# Patient Record
Sex: MALE | Race: BLACK OR AFRICAN AMERICAN | Employment: OTHER | URBAN - METROPOLITAN AREA
[De-identification: names, ages, dates, MRNs, and addresses within clinical notes are randomized per-mention and may not be internally consistent; named-entity substitution may affect disease eponyms.]

---

## 2017-06-11 PROBLEM — H60.20 MALIGNANT OTITIS EXTERNA: Status: ACTIVE | Noted: 2017-06-11

## 2017-06-20 PROBLEM — H60.23: Status: ACTIVE | Noted: 2017-06-20

## 2018-11-19 ENCOUNTER — HOSPITAL ENCOUNTER (INPATIENT)
Age: 83
LOS: 5 days | Discharge: NURSING, REHAB OR LONG-TERM CARE | DRG: 871 | End: 2018-11-24
Attending: EMERGENCY MEDICINE | Admitting: INTERNAL MEDICINE
Payer: MEDICARE

## 2018-11-19 ENCOUNTER — APPOINTMENT (OUTPATIENT)
Dept: CT IMAGING | Age: 83
DRG: 871 | End: 2018-11-19
Attending: EMERGENCY MEDICINE
Payer: MEDICARE

## 2018-11-19 ENCOUNTER — APPOINTMENT (OUTPATIENT)
Dept: GENERAL RADIOLOGY | Age: 83
DRG: 871 | End: 2018-11-19
Attending: EMERGENCY MEDICINE
Payer: MEDICARE

## 2018-11-19 DIAGNOSIS — R65.21 SEPTIC SHOCK (HCC): Primary | ICD-10-CM

## 2018-11-19 DIAGNOSIS — R50.9 FEVER, UNSPECIFIED FEVER CAUSE: ICD-10-CM

## 2018-11-19 DIAGNOSIS — R33.9 URINARY RETENTION: ICD-10-CM

## 2018-11-19 DIAGNOSIS — E86.0 DEHYDRATION: ICD-10-CM

## 2018-11-19 DIAGNOSIS — K56.41 FECAL IMPACTION (HCC): ICD-10-CM

## 2018-11-19 DIAGNOSIS — A41.9 SEPTIC SHOCK (HCC): Primary | ICD-10-CM

## 2018-11-19 PROBLEM — J69.0 ASPIRATION PNEUMONIA (HCC): Status: ACTIVE | Noted: 2018-11-19

## 2018-11-19 LAB
ALBUMIN SERPL-MCNC: 3.7 G/DL (ref 3.4–5)
ALBUMIN/GLOB SERPL: 0.9 {RATIO} (ref 0.8–1.7)
ALP SERPL-CCNC: 82 U/L (ref 45–117)
ALT SERPL-CCNC: 25 U/L (ref 16–61)
ANION GAP SERPL CALC-SCNC: 15 MMOL/L (ref 3–18)
APPEARANCE UR: ABNORMAL
AST SERPL-CCNC: 22 U/L (ref 15–37)
BACTERIA URNS QL MICRO: ABNORMAL /HPF
BASOPHILS # BLD: 0 K/UL (ref 0–0.06)
BASOPHILS NFR BLD: 0 % (ref 0–3)
BILIRUB SERPL-MCNC: 1.4 MG/DL (ref 0.2–1)
BILIRUB UR QL: NEGATIVE
BUN SERPL-MCNC: 22 MG/DL (ref 7–18)
BUN/CREAT SERPL: 11 (ref 12–20)
CALCIUM SERPL-MCNC: 9.4 MG/DL (ref 8.5–10.1)
CHLORIDE SERPL-SCNC: 107 MMOL/L (ref 100–108)
CO2 SERPL-SCNC: 22 MMOL/L (ref 21–32)
COLOR UR: YELLOW
CREAT SERPL-MCNC: 1.95 MG/DL (ref 0.6–1.3)
DIFFERENTIAL METHOD BLD: ABNORMAL
EOSINOPHIL # BLD: 0 K/UL (ref 0–0.4)
EOSINOPHIL NFR BLD: 0 % (ref 0–5)
EPITH CASTS URNS QL MICRO: NEGATIVE /LPF (ref 0–5)
ERYTHROCYTE [DISTWIDTH] IN BLOOD BY AUTOMATED COUNT: 14.3 % (ref 11.6–14.5)
GLOBULIN SER CALC-MCNC: 4 G/DL (ref 2–4)
GLUCOSE SERPL-MCNC: 139 MG/DL (ref 74–99)
GLUCOSE UR STRIP.AUTO-MCNC: NEGATIVE MG/DL
HCT VFR BLD AUTO: 41.1 % (ref 36–48)
HGB BLD-MCNC: 14 G/DL (ref 13–16)
HGB UR QL STRIP: ABNORMAL
KETONES UR QL STRIP.AUTO: NEGATIVE MG/DL
LACTATE BLD-SCNC: 2.67 MMOL/L (ref 0.4–2)
LACTATE BLD-SCNC: 6.58 MMOL/L (ref 0.4–2)
LEUKOCYTE ESTERASE UR QL STRIP.AUTO: NEGATIVE
LYMPHOCYTES # BLD: 0.5 K/UL (ref 0.8–3.5)
LYMPHOCYTES NFR BLD: 3 % (ref 20–51)
MCH RBC QN AUTO: 29.5 PG (ref 24–34)
MCHC RBC AUTO-ENTMCNC: 34.1 G/DL (ref 31–37)
MCV RBC AUTO: 86.5 FL (ref 74–97)
MONOCYTES # BLD: 0.3 K/UL (ref 0–1)
MONOCYTES NFR BLD: 2 % (ref 2–9)
NEUTS BAND NFR BLD MANUAL: 14 % (ref 0–5)
NEUTS SEG # BLD: 15.5 K/UL (ref 1.8–8)
NEUTS SEG NFR BLD: 81 % (ref 42–75)
NITRITE UR QL STRIP.AUTO: NEGATIVE
PH UR STRIP: 5 [PH] (ref 5–8)
PLATELET # BLD AUTO: 212 K/UL (ref 135–420)
PLATELET COMMENTS,PCOM: ABNORMAL
PMV BLD AUTO: 10.9 FL (ref 9.2–11.8)
POTASSIUM SERPL-SCNC: 3.4 MMOL/L (ref 3.5–5.5)
PROT SERPL-MCNC: 7.7 G/DL (ref 6.4–8.2)
PROT UR STRIP-MCNC: NEGATIVE MG/DL
RBC # BLD AUTO: 4.75 M/UL (ref 4.7–5.5)
RBC #/AREA URNS HPF: ABNORMAL /HPF (ref 0–5)
RBC MORPH BLD: ABNORMAL
SODIUM SERPL-SCNC: 144 MMOL/L (ref 136–145)
SP GR UR REFRACTOMETRY: 1.01 (ref 1–1.03)
UROBILINOGEN UR QL STRIP.AUTO: 0.2 EU/DL (ref 0.2–1)
WBC # BLD AUTO: 16.3 K/UL (ref 4.6–13.2)
WBC URNS QL MICRO: ABNORMAL /HPF (ref 0–4)

## 2018-11-19 PROCEDURE — 74011000250 HC RX REV CODE- 250: Performed by: STUDENT IN AN ORGANIZED HEALTH CARE EDUCATION/TRAINING PROGRAM

## 2018-11-19 PROCEDURE — 96366 THER/PROPH/DIAG IV INF ADDON: CPT

## 2018-11-19 PROCEDURE — 87077 CULTURE AEROBIC IDENTIFY: CPT

## 2018-11-19 PROCEDURE — 77030005563 HC CATH URETH INT MMGH -A

## 2018-11-19 PROCEDURE — 96365 THER/PROPH/DIAG IV INF INIT: CPT

## 2018-11-19 PROCEDURE — 77030005514 HC CATH URETH FOL14 BARD -A

## 2018-11-19 PROCEDURE — 74011000250 HC RX REV CODE- 250: Performed by: EMERGENCY MEDICINE

## 2018-11-19 PROCEDURE — 81001 URINALYSIS AUTO W/SCOPE: CPT

## 2018-11-19 PROCEDURE — 96361 HYDRATE IV INFUSION ADD-ON: CPT

## 2018-11-19 PROCEDURE — 74176 CT ABD & PELVIS W/O CONTRAST: CPT

## 2018-11-19 PROCEDURE — 83605 ASSAY OF LACTIC ACID: CPT

## 2018-11-19 PROCEDURE — 87040 BLOOD CULTURE FOR BACTERIA: CPT

## 2018-11-19 PROCEDURE — 99285 EMERGENCY DEPT VISIT HI MDM: CPT

## 2018-11-19 PROCEDURE — 80053 COMPREHEN METABOLIC PANEL: CPT

## 2018-11-19 PROCEDURE — 74011250637 HC RX REV CODE- 250/637: Performed by: EMERGENCY MEDICINE

## 2018-11-19 PROCEDURE — 71045 X-RAY EXAM CHEST 1 VIEW: CPT

## 2018-11-19 PROCEDURE — 87086 URINE CULTURE/COLONY COUNT: CPT

## 2018-11-19 PROCEDURE — 87186 SC STD MICRODIL/AGAR DIL: CPT

## 2018-11-19 PROCEDURE — 85025 COMPLETE CBC W/AUTO DIFF WBC: CPT

## 2018-11-19 PROCEDURE — 74011250636 HC RX REV CODE- 250/636: Performed by: STUDENT IN AN ORGANIZED HEALTH CARE EDUCATION/TRAINING PROGRAM

## 2018-11-19 PROCEDURE — 74011250636 HC RX REV CODE- 250/636: Performed by: EMERGENCY MEDICINE

## 2018-11-19 PROCEDURE — 96375 TX/PRO/DX INJ NEW DRUG ADDON: CPT

## 2018-11-19 PROCEDURE — 84100 ASSAY OF PHOSPHORUS: CPT

## 2018-11-19 PROCEDURE — 65660000004 HC RM CVT STEPDOWN

## 2018-11-19 PROCEDURE — 93005 ELECTROCARDIOGRAM TRACING: CPT

## 2018-11-19 PROCEDURE — 74011000258 HC RX REV CODE- 258: Performed by: EMERGENCY MEDICINE

## 2018-11-19 PROCEDURE — 83735 ASSAY OF MAGNESIUM: CPT

## 2018-11-19 PROCEDURE — 77010033678 HC OXYGEN DAILY

## 2018-11-19 RX ORDER — VANCOMYCIN/0.9 % SOD CHLORIDE 1.5G/250ML
1500 PLASTIC BAG, INJECTION (ML) INTRAVENOUS ONCE
Status: COMPLETED | OUTPATIENT
Start: 2018-11-19 | End: 2018-11-19

## 2018-11-19 RX ORDER — LEVOFLOXACIN 5 MG/ML
750 INJECTION, SOLUTION INTRAVENOUS EVERY 24 HOURS
Status: DISCONTINUED | OUTPATIENT
Start: 2018-11-19 | End: 2018-11-19

## 2018-11-19 RX ORDER — VANCOMYCIN/0.9 % SOD CHLORIDE 1 G/100 ML
1000 PLASTIC BAG, INJECTION (ML) INTRAVENOUS ONCE
Status: DISCONTINUED | OUTPATIENT
Start: 2018-11-19 | End: 2018-11-19 | Stop reason: DRUGHIGH

## 2018-11-19 RX ORDER — VANCOMYCIN HYDROCHLORIDE
1250
Status: DISCONTINUED | OUTPATIENT
Start: 2018-11-21 | End: 2018-11-20 | Stop reason: DRUGHIGH

## 2018-11-19 RX ORDER — ACETAMINOPHEN 650 MG/1
650 SUPPOSITORY RECTAL
Status: COMPLETED | OUTPATIENT
Start: 2018-11-19 | End: 2018-11-19

## 2018-11-19 RX ORDER — LEVOFLOXACIN 5 MG/ML
750 INJECTION, SOLUTION INTRAVENOUS
Status: DISCONTINUED | OUTPATIENT
Start: 2018-11-19 | End: 2018-11-23

## 2018-11-19 RX ORDER — SODIUM CHLORIDE 0.9 % (FLUSH) 0.9 %
5-10 SYRINGE (ML) INJECTION AS NEEDED
Status: DISCONTINUED | OUTPATIENT
Start: 2018-11-19 | End: 2018-11-24 | Stop reason: HOSPADM

## 2018-11-19 RX ADMIN — LEVOFLOXACIN 750 MG: 5 INJECTION, SOLUTION INTRAVENOUS at 17:23

## 2018-11-19 RX ADMIN — SODIUM CHLORIDE 1000 ML: 900 INJECTION, SOLUTION INTRAVENOUS at 17:25

## 2018-11-19 RX ADMIN — SODIUM CHLORIDE 1000 ML: 900 INJECTION, SOLUTION INTRAVENOUS at 16:50

## 2018-11-19 RX ADMIN — VANCOMYCIN HYDROCHLORIDE 1500 MG: 10 INJECTION, POWDER, LYOPHILIZED, FOR SOLUTION INTRAVENOUS at 17:23

## 2018-11-19 RX ADMIN — SODIUM CHLORIDE 600 MG: 900 INJECTION, SOLUTION INTRAVENOUS at 23:34

## 2018-11-19 RX ADMIN — WATER 2 G: 1 INJECTION INTRAMUSCULAR; INTRAVENOUS; SUBCUTANEOUS at 17:22

## 2018-11-19 RX ADMIN — ACETAMINOPHEN 650 MG: 650 SUPPOSITORY RECTAL at 18:51

## 2018-11-19 RX ADMIN — SODIUM CHLORIDE 250 ML: 900 INJECTION, SOLUTION INTRAVENOUS at 18:50

## 2018-11-19 NOTE — ED TRIAGE NOTES
Per Medical transport ([facility]), Patient is from [facility]. They said the patient has had altered mental status that was noticed around 1100 this morning.

## 2018-11-19 NOTE — H&P
EMERGENCY DEPARTMENT HISTORY AND PHYSICAL EXAM 
 
4:18 PM 
 
 
Date: 11/19/2018 Patient Name: Jana Billingsley History of Presenting Illness No chief complaint on file. History Provided By: Patient's Sister Chief Complaint: altered mental status Duration:  Hours Timing:  Worsening Location: n/a Quality: constant Severity: Moderate Modifying Factors: none Associated Symptoms: anorexia Additional History (Context): Jana Billingsley is a 80 y.o. male with past medical history of bilateral deafness, type II diabetes, hypertension, and prostate hypertrophy with urinary obstruction. He is a resident of [address] [place] [address] and was brought in by EMS. EMS stated that patient is altered at baseline but had become more altered this afternoon which prompted caregivers to call EMS. He was AOx1 but followed commands. Sister of patient present in ED, stated patient had good appetite and was at baseline yesterday. Today, she stated that he would only tell her that he doesn't feel well but would not elaborate. Patient will follow commands in the ED but will not answer questions. PCP: Other, MD Shan 
 
Current Outpatient Medications Medication Sig Dispense Refill  predniSONE (DELTASONE) 10 mg tablet 60 mg PO daily, decrease 5 mg every 3 days until complete 88 Tab 0  
 erythromycin (ILOTYCIN) ophthalmic ointment Thin film both eyes Q8 hrs x 5 days 1 Tube 0  
 bisacodyl (DULCOLAX, BISACODYL,) 10 mg suppository Insert 10 mg into rectum daily as needed.  sodium phosphate (ENEMA) 19-7 gram/118 mL enema Insert 1 Enema into rectum as needed.  glucagon (GLUCAGEN) 1 mg injection 1 mg by IntraVENous route as needed for Hypoglycemia.  bisacodyl (DULCOLAX, BISACODYL,) 5 mg EC tablet Take 10 mg by mouth daily as needed for Constipation. Indications: Constipation, do not crush  SITagliptin (JANUVIA) 25 mg tablet Take 25 mg by mouth daily. Do not crush  insulin glargine (LANTUS) 100 unit/mL injection 18 Units by SubCUTAneous route nightly.  furosemide (LASIX) 20 mg tablet Take 20 mg by mouth daily. Indications: Edema  atorvastatin (LIPITOR) 10 mg tablet Take 10 mg by mouth nightly. Indications: hypercholesterolemia  insulin aspart (NOVOLOG) 100 unit/mL injection 1-10 Units by SubCUTAneous route Before breakfast, lunch, dinner and at bedtime. Sliding scale 0-200=0units 201-250=2units 251-350=4units 351-400=6units 401-450=8units 451+=10units and call MD    
 levothyroxine (SYNTHROID) 25 mcg tablet Take 25 mcg by mouth Daily (before breakfast).  cholecalciferol, vitamin D3, 2,000 unit tab Take 1 Tab by mouth daily. Indications: VITAMIN D DEFICIENCY    
 amLODIPine (NORVASC) 10 mg tablet Take 10 mg by mouth daily. 1  
 acetaminophen (TYLENOL) 325 mg tablet Take 325 mg by mouth every six (6) hours as needed for Pain or Fever.  tamsulosin (FLOMAX) 0.4 mg capsule Take 0.4 mg by mouth nightly.  finasteride (PROSCAR) 5 mg tablet Take 5 mg by mouth daily.  lisinopril (PRINIVIL, ZESTRIL) 10 mg tablet Take 10 mg by mouth daily. Past History Past Medical History: 
Past Medical History:  
Diagnosis Date  Deaf   
 bilateral  
 Deaf  Diabetes ([facility] [address]  Hyperglycemia  Hypertension  Hypertrophy of prostate with urinary obstruction and other lower urinary tract symptoms (LUTS)  Retention of urine  Retention, urine  Scarlet fever 1946 left pt deaf  UTI (lower urinary tract infection) Past Surgical History: 
Past Surgical History:  
Procedure Laterality Date [address] [place] Family History: 
Family History Problem Relation Age of Onset  Hypertension Mother Social History: 
Social History Tobacco Use  Smoking status: Never Smoker  Smokeless tobacco: Never Used Substance Use Topics  Alcohol use: No  
  Alcohol/week: 0.0 oz  Drug use:  No  
 
 
 Allergies: 
No Known Allergies Review of Systems Review of Systems Unable to perform ROS: Mental status change Physical Exam  
 
Visit Vitals /66 Pulse (!) 104 Temp (!) 102 °F (38.9 °C) Resp 24 Ht 5' 3\" (1.6 m) Wt 75 kg (165 lb 4.8 oz) SpO2 99% BMI 29.28 kg/m² Physical Exam  
Constitutional: He appears well-developed and well-nourished. He appears lethargic. No distress. Neck: Neck supple. Cardiovascular:  
Tachycardic, normal rhythm, no murmurs, rubs, gallops. Pulmonary/Chest: Effort normal and breath sounds normal. No respiratory distress. Abdominal: He exhibits no distension. There is no tenderness. Musculoskeletal: He exhibits no edema. Lymphadenopathy:  
  He has no cervical adenopathy. Neurological: He appears lethargic. Squeezes hand on command but does not answer questions Skin: Skin is warm and dry. He is not diaphoretic. Diagnostic Study Results Labs - Recent Results (from the past 12 hour(s)) CBC WITH AUTOMATED DIFF Collection Time: 11/19/18  4:15 PM  
Result Value Ref Range WBC 16.3 (H) 4.6 - 13.2 K/uL  
 RBC 4.75 4.70 - 5.50 M/uL  
 HGB 14.0 13.0 - 16.0 g/dL HCT 41.1 36.0 - 48.0 % MCV 86.5 74.0 - 97.0 FL  
 MCH 29.5 24.0 - 34.0 PG  
 MCHC 34.1 31.0 - 37.0 g/dL  
 RDW 14.3 11.6 - 14.5 % PLATELET 593 103 - 381 K/uL MPV 10.9 9.2 - 11.8 FL  
 NEUTROPHILS PENDING % LYMPHOCYTES PENDING % MONOCYTES PENDING % EOSINOPHILS PENDING % BASOPHILS PENDING %  
 ABS. NEUTROPHILS PENDING K/UL  
 ABS. LYMPHOCYTES PENDING K/UL  
 ABS. MONOCYTES PENDING K/UL  
 ABS. EOSINOPHILS PENDING K/UL  
 ABS. BASOPHILS PENDING K/UL  
 DF PENDING   
POC LACTIC ACID Collection Time: 11/19/18  4:43 PM  
Result Value Ref Range Lactic Acid (POC) 6.30 (HH) 0.40 - 2.00 mmol/L POC LACTIC ACID Collection Time: 11/19/18  4:50 PM  
Result Value Ref Range  Lactic Acid (POC) 6.58 (HH) 0.40 - 2.00 mmol/L  
 
 
 Radiologic Studies -  
CT ABD PELV WO CONT Final Result XR CHEST PORT Final Result Medical Decision Making I am the first provider for this patient. I reviewed the vital signs, available nursing notes, past medical history, past surgical history, family history and social history. Vital Signs-Reviewed the patient's vital signs. Pulse Oximetry Analysis -  100% on room air Cardiac Monitor: 
Rate: 128 Rhythm:  Sinus Tachycardia EKG: Interpreted by the EP. Time Interpreted: 5942 Rate: 105 Rhythm: Sinus Tachycardia Interpretation: sepsis Records Reviewed: Nursing Notes and Old Medical Records (Time of Review: 4:18 PM) ED Course: Progress Notes, Reevaluation, and Consults: 
1643 Lactic acid of 6.3; urine with 3+ bacteria, moderate blood. Vancomycin and levofloxacin started. Provider Notes (Medical Decision Making): Patient meets criteria for septic shock. Hospitalist has agreed to accept admission. Patient's abdomen is distended and may be source of infection. CT showed severe fecal impaction and likely aspiration pneumonia. No evidence of hypoxia, blood pressure improved and stable. For Hospitalized Patients: 
 
1. Hospitalization Decision Time: The decision to hospitalize the patient was made by Dr. Jacquelin Schlatter at 11:05 PM on 11/19/2018 2. Aspirin: Aspirin was not given because the patient did not present with a stroke at the time of their Emergency Department evaluation Diagnosis Clinical Impression: septic shock Disposition: admitted Follow-up Information None Medication List  
  
ASK your doctor about these medications   
acetaminophen 325 mg tablet Commonly known as:  TYLENOL 
  
amLODIPine 10 mg tablet Commonly known as:  NORVASC 
  
cholecalciferol (vitamin D3) 2,000 unit Tab * DULCOLAX (BISACODYL) 5 mg EC tablet Generic drug:  bisacodyl * DULCOLAX (BISACODYL) 10 mg suppository Generic drug:  bisacodyl ENEMA 19-7 gram/118 mL enema Generic drug:  sodium phosphate 
  
erythromycin ophthalmic ointment Commonly known as:  ILOTYCIN Thin film both eyes Q8 hrs x 5 days 
  
finasteride 5 mg tablet Commonly known as:  PROSCAR 
  
glucagon 1 mg injection Commonly known as:  GLUCAGEN 
  
JANUVIA 25 mg tablet Generic drug:  SITagliptin LANTUS U-100 INSULIN 100 unit/mL injection Generic drug:  insulin glargine LASIX 20 mg tablet Generic drug:  furosemide LIPITOR 10 mg tablet Generic drug:  atorvastatin 
  
lisinopril 10 mg tablet Commonly known as:  Lawson Valentín NovoLOG U-100 Insulin aspart 100 unit/mL injection Generic drug:  insulin aspart U-100 
  
predniSONE 10 mg tablet Commonly known as:  DELTASONE 
60 mg PO daily, decrease 5 mg every 3 days until complete 
  
synthroid 25 mcg tablet Generic drug:  levothyroxine 
  
tamsulosin 0.4 mg capsule Commonly known as:  FLOMAX * This list has 2 medication(s) that are the same as other medications prescribed for you. Read the directions carefully, and ask your doctor or other care provider to review them with you.  
  
  
  
 
_______________________________ Scribe Attestation Deven Fermin DO acting as a scribe for and in the presence of Kristan Reinoso MD     
November 19, 2018 at 4:18 PM 
    
Provider Attestation:     
I personally performed the services described in the documentation, reviewed the documentation, as recorded by the scribe in my presence, and it accurately and completely records my words and actions. November 19, 2018 at 4:18 PM - Kristan Reinoso MD   
 
 
_______________________________

## 2018-11-20 LAB
ALBUMIN SERPL-MCNC: 2.5 G/DL (ref 3.4–5)
ALBUMIN/GLOB SERPL: 0.8 {RATIO} (ref 0.8–1.7)
ALP SERPL-CCNC: 55 U/L (ref 45–117)
ALT SERPL-CCNC: 17 U/L (ref 16–61)
ANION GAP SERPL CALC-SCNC: 10 MMOL/L (ref 3–18)
AST SERPL-CCNC: 23 U/L (ref 15–37)
BASOPHILS # BLD: 0 K/UL (ref 0–0.06)
BASOPHILS NFR BLD: 0 % (ref 0–3)
BILIRUB SERPL-MCNC: 1.2 MG/DL (ref 0.2–1)
BUN SERPL-MCNC: 20 MG/DL (ref 7–18)
BUN/CREAT SERPL: 16 (ref 12–20)
CALCIUM SERPL-MCNC: 7.9 MG/DL (ref 8.5–10.1)
CHLORIDE SERPL-SCNC: 113 MMOL/L (ref 100–108)
CO2 SERPL-SCNC: 22 MMOL/L (ref 21–32)
CREAT SERPL-MCNC: 1.25 MG/DL (ref 0.6–1.3)
CREAT UR-MCNC: 31.9 MG/DL (ref 30–125)
DIFFERENTIAL METHOD BLD: ABNORMAL
EOSINOPHIL # BLD: 0 K/UL (ref 0–0.4)
EOSINOPHIL #/AREA URNS HPF: NORMAL /[HPF]
EOSINOPHIL NFR BLD: 0 % (ref 0–5)
ERYTHROCYTE [DISTWIDTH] IN BLOOD BY AUTOMATED COUNT: 14.7 % (ref 11.6–14.5)
EST. AVERAGE GLUCOSE BLD GHB EST-MCNC: 169 MG/DL
GLOBULIN SER CALC-MCNC: 3.2 G/DL (ref 2–4)
GLUCOSE BLD STRIP.AUTO-MCNC: 135 MG/DL (ref 70–110)
GLUCOSE BLD STRIP.AUTO-MCNC: 78 MG/DL (ref 70–110)
GLUCOSE BLD STRIP.AUTO-MCNC: 85 MG/DL (ref 70–110)
GLUCOSE BLD STRIP.AUTO-MCNC: 95 MG/DL (ref 70–110)
GLUCOSE SERPL-MCNC: 71 MG/DL (ref 74–99)
HBA1C MFR BLD: 7.5 % (ref 4.2–5.6)
HCT VFR BLD AUTO: 33.2 % (ref 36–48)
HGB BLD-MCNC: 11.1 G/DL (ref 13–16)
L PNEUMO AG UR QL IA: NEGATIVE
LACTATE BLD-SCNC: 1.27 MMOL/L (ref 0.4–2)
LACTATE BLD-SCNC: 6.3 MMOL/L (ref 0.4–2)
LACTATE SERPL-SCNC: 1.1 MMOL/L (ref 0.4–2)
LACTATE SERPL-SCNC: 1.3 MMOL/L (ref 0.4–2)
LACTATE SERPL-SCNC: 1.3 MMOL/L (ref 0.4–2)
LACTATE SERPL-SCNC: 1.4 MMOL/L (ref 0.4–2)
LYMPHOCYTES # BLD: 1.5 K/UL (ref 0.8–3.5)
LYMPHOCYTES NFR BLD: 6 % (ref 20–51)
MAGNESIUM SERPL-MCNC: 1.8 MG/DL (ref 1.6–2.6)
MAGNESIUM SERPL-MCNC: 2.1 MG/DL (ref 1.6–2.6)
MCH RBC QN AUTO: 29.1 PG (ref 24–34)
MCHC RBC AUTO-ENTMCNC: 33.4 G/DL (ref 31–37)
MCV RBC AUTO: 87.1 FL (ref 74–97)
MONOCYTES # BLD: 0.8 K/UL (ref 0–1)
MONOCYTES NFR BLD: 3 % (ref 2–9)
NEUTS BAND NFR BLD MANUAL: 9 % (ref 0–5)
NEUTS SEG # BLD: 22.9 K/UL (ref 1.8–8)
NEUTS SEG NFR BLD: 82 % (ref 42–75)
PHOSPHATE SERPL-MCNC: 1.1 MG/DL (ref 2.5–4.9)
PHOSPHATE SERPL-MCNC: 2.7 MG/DL (ref 2.5–4.9)
PLATELET # BLD AUTO: 165 K/UL (ref 135–420)
PLATELET COMMENTS,PCOM: ABNORMAL
PMV BLD AUTO: 11 FL (ref 9.2–11.8)
POTASSIUM SERPL-SCNC: 3.7 MMOL/L (ref 3.5–5.5)
PROT SERPL-MCNC: 5.7 G/DL (ref 6.4–8.2)
RBC # BLD AUTO: 3.81 M/UL (ref 4.7–5.5)
RBC MORPH BLD: ABNORMAL
S PNEUM AG UR QL: NEGATIVE
SODIUM SERPL-SCNC: 145 MMOL/L (ref 136–145)
SODIUM UR-SCNC: 90 MMOL/L (ref 20–110)
VANCOMYCIN SERPL-MCNC: 10.8 UG/ML (ref 5–40)
WBC # BLD AUTO: 25.2 K/UL (ref 4.6–13.2)

## 2018-11-20 PROCEDURE — 84300 ASSAY OF URINE SODIUM: CPT

## 2018-11-20 PROCEDURE — 74011000272 HC RX REV CODE- 272: Performed by: INTERNAL MEDICINE

## 2018-11-20 PROCEDURE — 83605 ASSAY OF LACTIC ACID: CPT

## 2018-11-20 PROCEDURE — 83036 HEMOGLOBIN GLYCOSYLATED A1C: CPT

## 2018-11-20 PROCEDURE — 92610 EVALUATE SWALLOWING FUNCTION: CPT

## 2018-11-20 PROCEDURE — 92526 ORAL FUNCTION THERAPY: CPT

## 2018-11-20 PROCEDURE — 80053 COMPREHEN METABOLIC PANEL: CPT

## 2018-11-20 PROCEDURE — 83735 ASSAY OF MAGNESIUM: CPT

## 2018-11-20 PROCEDURE — 82570 ASSAY OF URINE CREATININE: CPT

## 2018-11-20 PROCEDURE — 77010033678 HC OXYGEN DAILY

## 2018-11-20 PROCEDURE — 65660000004 HC RM CVT STEPDOWN

## 2018-11-20 PROCEDURE — 87449 NOS EACH ORGANISM AG IA: CPT

## 2018-11-20 PROCEDURE — 87450 LEGIONELLA PNEUMOPHILA AG, URINE: CPT

## 2018-11-20 PROCEDURE — 87804 INFLUENZA ASSAY W/OPTIC: CPT

## 2018-11-20 PROCEDURE — 87205 SMEAR GRAM STAIN: CPT

## 2018-11-20 PROCEDURE — 74011250636 HC RX REV CODE- 250/636: Performed by: EMERGENCY MEDICINE

## 2018-11-20 PROCEDURE — 36415 COLL VENOUS BLD VENIPUNCTURE: CPT

## 2018-11-20 PROCEDURE — 74011000258 HC RX REV CODE- 258: Performed by: INTERNAL MEDICINE

## 2018-11-20 PROCEDURE — 74011250637 HC RX REV CODE- 250/637: Performed by: INTERNAL MEDICINE

## 2018-11-20 PROCEDURE — 84100 ASSAY OF PHOSPHORUS: CPT

## 2018-11-20 PROCEDURE — 85025 COMPLETE CBC W/AUTO DIFF WBC: CPT

## 2018-11-20 PROCEDURE — 82962 GLUCOSE BLOOD TEST: CPT

## 2018-11-20 PROCEDURE — 77030037878 HC DRSG MEPILEX >48IN BORD MOLN -B

## 2018-11-20 PROCEDURE — 80202 ASSAY OF VANCOMYCIN: CPT

## 2018-11-20 PROCEDURE — 74011250636 HC RX REV CODE- 250/636: Performed by: INTERNAL MEDICINE

## 2018-11-20 RX ORDER — LEVOTHYROXINE SODIUM 25 UG/1
25 TABLET ORAL
Status: DISCONTINUED | OUTPATIENT
Start: 2018-11-20 | End: 2018-11-20 | Stop reason: SDUPTHER

## 2018-11-20 RX ORDER — FACIAL-BODY WIPES
10 EACH TOPICAL DAILY PRN
Status: DISCONTINUED | OUTPATIENT
Start: 2018-11-20 | End: 2018-11-24 | Stop reason: HOSPADM

## 2018-11-20 RX ORDER — VANCOMYCIN HYDROCHLORIDE
1250 ONCE
Status: COMPLETED | OUTPATIENT
Start: 2018-11-20 | End: 2018-11-20

## 2018-11-20 RX ORDER — INSULIN LISPRO 100 [IU]/ML
INJECTION, SOLUTION INTRAVENOUS; SUBCUTANEOUS
Status: DISCONTINUED | OUTPATIENT
Start: 2018-11-20 | End: 2018-11-24 | Stop reason: HOSPADM

## 2018-11-20 RX ORDER — SODIUM CHLORIDE 9 MG/ML
100 INJECTION, SOLUTION INTRAVENOUS CONTINUOUS
Status: DISCONTINUED | OUTPATIENT
Start: 2018-11-20 | End: 2018-11-21

## 2018-11-20 RX ORDER — LEVOTHYROXINE SODIUM 25 UG/1
25 TABLET ORAL
Status: DISCONTINUED | OUTPATIENT
Start: 2018-11-20 | End: 2018-11-24 | Stop reason: HOSPADM

## 2018-11-20 RX ORDER — FINASTERIDE 5 MG/1
5 TABLET, FILM COATED ORAL DAILY
Status: DISCONTINUED | OUTPATIENT
Start: 2018-11-20 | End: 2018-11-24 | Stop reason: HOSPADM

## 2018-11-20 RX ORDER — TAMSULOSIN HYDROCHLORIDE 0.4 MG/1
0.4 CAPSULE ORAL
Status: DISCONTINUED | OUTPATIENT
Start: 2018-11-20 | End: 2018-11-24 | Stop reason: HOSPADM

## 2018-11-20 RX ORDER — HEPARIN SODIUM 5000 [USP'U]/ML
5000 INJECTION, SOLUTION INTRAVENOUS; SUBCUTANEOUS EVERY 8 HOURS
Status: DISCONTINUED | OUTPATIENT
Start: 2018-11-20 | End: 2018-11-24 | Stop reason: HOSPADM

## 2018-11-20 RX ORDER — MAGNESIUM SULFATE 100 %
4 CRYSTALS MISCELLANEOUS AS NEEDED
Status: DISCONTINUED | OUTPATIENT
Start: 2018-11-20 | End: 2018-11-24 | Stop reason: HOSPADM

## 2018-11-20 RX ORDER — DEXTROSE 50 % IN WATER (D50W) INTRAVENOUS SYRINGE
25-50 AS NEEDED
Status: DISCONTINUED | OUTPATIENT
Start: 2018-11-20 | End: 2018-11-24 | Stop reason: HOSPADM

## 2018-11-20 RX ORDER — SENNOSIDES 8.6 MG/1
1 TABLET ORAL DAILY
Status: DISCONTINUED | OUTPATIENT
Start: 2018-11-20 | End: 2018-11-24 | Stop reason: HOSPADM

## 2018-11-20 RX ADMIN — SENNOSIDES 8.6 MG: 8.6 TABLET, FILM COATED ORAL at 10:59

## 2018-11-20 RX ADMIN — LEVOTHYROXINE SODIUM 25 MCG: 25 TABLET ORAL at 06:10

## 2018-11-20 RX ADMIN — FINASTERIDE 5 MG: 5 TABLET, FILM COATED ORAL at 10:59

## 2018-11-20 RX ADMIN — PIPERACILLIN SODIUM AND TAZOBACTAM SODIUM 3.38 G: 3; .375 INJECTION, POWDER, LYOPHILIZED, FOR SOLUTION INTRAVENOUS at 04:17

## 2018-11-20 RX ADMIN — HEPARIN SODIUM 5000 UNITS: 5000 INJECTION, SOLUTION INTRAVENOUS; SUBCUTANEOUS at 11:00

## 2018-11-20 RX ADMIN — HEPARIN SODIUM 5000 UNITS: 5000 INJECTION, SOLUTION INTRAVENOUS; SUBCUTANEOUS at 02:28

## 2018-11-20 RX ADMIN — Medication 10 ML: at 06:10

## 2018-11-20 RX ADMIN — SODIUM CHLORIDE 100 ML/HR: 900 INJECTION, SOLUTION INTRAVENOUS at 17:10

## 2018-11-20 RX ADMIN — TAMSULOSIN HYDROCHLORIDE 0.4 MG: 0.4 CAPSULE ORAL at 21:49

## 2018-11-20 RX ADMIN — PIPERACILLIN SODIUM AND TAZOBACTAM SODIUM 3.38 G: 3; .375 INJECTION, POWDER, LYOPHILIZED, FOR SOLUTION INTRAVENOUS at 10:57

## 2018-11-20 RX ADMIN — HEPARIN SODIUM 5000 UNITS: 5000 INJECTION, SOLUTION INTRAVENOUS; SUBCUTANEOUS at 17:07

## 2018-11-20 RX ADMIN — Medication 500 ML: at 11:03

## 2018-11-20 RX ADMIN — PIPERACILLIN SODIUM AND TAZOBACTAM SODIUM 3.38 G: 3; .375 INJECTION, POWDER, LYOPHILIZED, FOR SOLUTION INTRAVENOUS at 17:07

## 2018-11-20 RX ADMIN — SODIUM CHLORIDE 1000 ML: 900 INJECTION, SOLUTION INTRAVENOUS at 00:42

## 2018-11-20 RX ADMIN — SODIUM CHLORIDE 100 ML/HR: 900 INJECTION, SOLUTION INTRAVENOUS at 02:16

## 2018-11-20 RX ADMIN — PIPERACILLIN SODIUM AND TAZOBACTAM SODIUM 3.38 G: 3; .375 INJECTION, POWDER, LYOPHILIZED, FOR SOLUTION INTRAVENOUS at 21:49

## 2018-11-20 RX ADMIN — VANCOMYCIN HYDROCHLORIDE 1250 MG: 10 INJECTION, POWDER, LYOPHILIZED, FOR SOLUTION INTRAVENOUS at 10:58

## 2018-11-20 NOTE — PROGRESS NOTES
[facility] evaluation: 
 
Patient seen and evaluated by me in the ED. Alert, but cannot obtain history 2/2 to patient's deafness & dementia. Patient hemodynamically stable and not currently requiring pressors. Blood pressure on monitor during my evaluation was 738 systolic. Patient stable enough to go to stepdown vs ICU. Patient Vitals for the past 4 hrs: 
 Pulse Resp BP SpO2  
11/19/18 2030 (!) 103 17 99/55 99 % 11/19/18 2015 (!) 108 22 101/49 98 % 11/19/18 1945 (!) 108 21 99/57 99 % 11/19/18 1930 (!) 110 20 96/54 98 % 11/19/18 1915 (!) 114 27 102/57 100 % 11/19/18 1900 (!) 120 27 113/66 100 % Physical exam:  
 
General:  Alert, resting comfortably in no distress Head:  Normocephalic, without obvious abnormality, atraumatic. Eyes:  Conjunctivae/corneas clear. PERRL, EOMs intact. L ptosis Throat: Lips, mucosa, and tongue normal. Teeth and gums normal.  
Neck: Supple, symmetrical, trachea midline Lungs:   Clear to auscultation bilaterally. Chest wall:  No tenderness or deformity. Heart:  Tachycardic rate and rhythm, S1, S2 normal, no murmur, click, rub or gallop. Abdomen:    distended, active bowel sounds. Extremities: Extremities normal, atraumatic, no cyanosis 2+ pitting edema bilateral lower extremities. Pulses: 2+ and symmetric all extremities. Skin: Skin color, texture, turgor normal. No rashes or lesions. Normal capillary refill. Neurologic: Unable to ascertain fully as patient is deaf, however, patient tracks, moves all extremities spontaneously. No facial droop noted.   
 
Yue Euceda PA-C

## 2018-11-20 NOTE — PROGRESS NOTES
[facility] Hospitalist Group Progress Note Patient: Tara Fitch Age: 80 y.o. : 1930 MR#: [id number] SSN: [id number] Date/Time: 2018 Subjective: I personally saw and evaluated this patient on 18. Patient sitting in bed in NAD, denies abdominal pain. [place]. RN in room states that patient did have small pasty BM after enema Assessment/Plan: 1- Severe Sepsis ( POA ) likely 2/2 aspiration PNA or UTI 2- Aspiration PNA 3- JIMOB at admission 4- Fecal Impaction 5- DM2 
6- BPH 
7- Hard of hearing PLAN 
on abx , follow cx Iv fluids Diet as per speech S/p enema, may need GI eval 
Monitor sugars D/w RN Case discussed with:  []Patient  []Family  []Nursing  []Case Management DVT Prophylaxis:  []Lovenox  []Hep SQ  []SCDs  []Coumadin   []On Heparin gtt Objective:  
VS:  
Visit Vitals /68 (BP 1 Location: Left arm, BP Patient Position: At rest) Pulse 87 Temp 98.3 °F (36.8 °C) Resp 19 Ht 5' 3\" (1.6 m) Wt 79 kg (174 lb 2.6 oz) SpO2 98% BMI 30.85 kg/m² Tmax/24hrs: Temp (24hrs), Av.9 °F (37.7 °C), Min:98.3 °F (36.8 °C), Max:102 °F (38.9 °C) Input/Output:  
 
Intake/Output Summary (Last 24 hours) at 2018 1333 Last data filed at 2018 1221 Gross per 24 hour Intake 5228.33 ml Output 1700 ml Net 3528.33 ml General:  Awake, follows commands Cardiovascular:  S1S2+, RRR Pulmonary:  Coarse bs b/l GI:  Soft, BS+, NT, ND Extremities:  trace edema Labs:   
Recent Results (from the past 24 hour(s)) CBC WITH AUTOMATED DIFF Collection Time: 18  4:15 PM  
Result Value Ref Range WBC 16.3 (H) 4.6 - 13.2 K/uL  
 RBC 4.75 4.70 - 5.50 M/uL  
 HGB 14.0 13.0 - 16.0 g/dL HCT 41.1 36.0 - 48.0 % MCV 86.5 74.0 - 97.0 FL  
 MCH 29.5 24.0 - 34.0 PG  
 MCHC 34.1 31.0 - 37.0 g/dL  
 RDW 14.3 11.6 - 14.5 % PLATELET 892 945 - 794 K/uL MPV 10.9 9.2 - 11.8 FL  
 NEUTROPHILS 81 (H) 42 - 75 % BAND NEUTROPHILS 14 (H) 0 - 5 % LYMPHOCYTES 3 (L) 20 - 51 % MONOCYTES 2 2 - 9 % EOSINOPHILS 0 0 - 5 % BASOPHILS 0 0 - 3 %  
 ABS. NEUTROPHILS 15.5 (H) 1.8 - 8.0 K/UL  
 ABS. LYMPHOCYTES 0.5 (L) 0.8 - 3.5 K/UL  
 ABS. MONOCYTES 0.3 0 - 1.0 K/UL  
 ABS. EOSINOPHILS 0.0 0.0 - 0.4 K/UL  
 ABS. BASOPHILS 0.0 0.0 - 0.06 K/UL  
 DF MANUAL PLATELET COMMENTS ADEQUATE PLATELETS    
 RBC COMMENTS NORMOCYTIC, NORMOCHROMIC METABOLIC PANEL, COMPREHENSIVE Collection Time: 11/19/18  4:15 PM  
Result Value Ref Range Sodium 144 136 - 145 mmol/L Potassium 3.4 (L) 3.5 - 5.5 mmol/L Chloride 107 100 - 108 mmol/L  
 CO2 22 21 - 32 mmol/L Anion gap 15 3.0 - 18 mmol/L Glucose 139 (H) 74 - 99 mg/dL BUN 22 (H) 7.0 - 18 MG/DL Creatinine 1.95 (H) 0.6 - 1.3 MG/DL  
 BUN/Creatinine ratio 11 (L) 12 - 20 GFR est AA 40 (L) >60 ml/min/1.73m2 GFR est non-AA 33 (L) >60 ml/min/1.73m2 Calcium 9.4 8.5 - 10.1 MG/DL Bilirubin, total 1.4 (H) 0.2 - 1.0 MG/DL  
 ALT (SGPT) 25 16 - 61 U/L  
 AST (SGOT) 22 15 - 37 U/L Alk. phosphatase 82 45 - 117 U/L Protein, total 7.7 6.4 - 8.2 g/dL Albumin 3.7 3.4 - 5.0 g/dL Globulin 4.0 2.0 - 4.0 g/dL A-G Ratio 0.9 0.8 - 1.7 CULTURE, BLOOD Collection Time: 11/19/18  4:15 PM  
Result Value Ref Range Special Requests: NO SPECIAL REQUESTS    
 GRAM STAIN AEROBIC AND ANAEROBIC BOTTLES GRAM NEGATIVE RODS    
 GRAM STAIN     
  SMEAR CALLED TO AND CORRECTLY REPEATED BY: ROMMEL RAHMAN RN CVT ON 11/20 AT 5811 TO Gloria Sandoval Culture result: CULTURE IN PROGRESS,FURTHER UPDATES TO FOLLOW MAGNESIUM Collection Time: 11/19/18  4:15 PM  
Result Value Ref Range Magnesium 2.1 1.6 - 2.6 mg/dL PHOSPHORUS Collection Time: 11/19/18  4:15 PM  
Result Value Ref Range Phosphorus 1.1 (L) 2.5 - 4.9 MG/DL  
CULTURE, BLOOD Collection Time: 11/19/18  4:30 PM  
Result Value Ref Range Special Requests: NO SPECIAL REQUESTS GRAM STAIN AEROBIC AND ANAEROBIC BOTTLES GRAM NEGATIVE RODS    
 GRAM STAIN     
  SMEAR CALLED TO AND CORRECTLY REPEATED BY: ROMMEL RAHMAN RN CVT ON 11/20 AT 6225 TO Debbie Banerjee Culture result: CULTURE IN PROGRESS,FURTHER UPDATES TO FOLLOW POC LACTIC ACID Collection Time: 11/19/18  4:43 PM  
Result Value Ref Range Lactic Acid (POC) 6.30 (HH) 0.40 - 2.00 mmol/L  
URINALYSIS W/ RFLX MICROSCOPIC Collection Time: 11/19/18  4:45 PM  
Result Value Ref Range Color YELLOW Appearance HAZY Specific gravity 1.015 1.003 - 1.030    
 pH (UA) 5.0 5.0 - 8.0 Protein NEGATIVE  NEG mg/dL Glucose NEGATIVE  NEG mg/dL Ketone NEGATIVE  NEG mg/dL Bilirubin NEGATIVE  NEG Blood MODERATE (A) NEG Urobilinogen 0.2 0.2 - 1.0 EU/dL Nitrites NEGATIVE  NEG Leukocyte Esterase NEGATIVE  NEG    
CULTURE, URINE Collection Time: 11/19/18  4:45 PM  
Result Value Ref Range Special Requests: NO SPECIAL REQUESTS Culture result: >100,000 COLONIES/mL GRAM NEGATIVE RODS (A) URINE MICROSCOPIC ONLY Collection Time: 11/19/18  4:45 PM  
Result Value Ref Range WBC 0 to 3 0 - 4 /hpf  
 RBC 0 to 3 0 - 5 /hpf Epithelial cells NEGATIVE  0 - 5 /lpf Bacteria 3+ (A) NEG /hpf  
EKG, 12 LEAD, INITIAL Collection Time: 11/19/18  4:46 PM  
Result Value Ref Range Ventricular Rate 105 BPM  
 Atrial Rate 105 BPM  
 P-R Interval 184 ms QRS Duration 96 ms  
 Q-T Interval 328 ms QTC Calculation (Bezet) 433 ms Calculated P Axis 43 degrees Calculated R Axis -63 degrees Calculated T Axis 79 degrees Diagnosis Sinus tachycardia Left anterior fascicular block Abnormal ECG No previous ECGs available POC LACTIC ACID Collection Time: 11/19/18  4:50 PM  
Result Value Ref Range Lactic Acid (POC) 6.58 (HH) 0.40 - 2.00 mmol/L POC LACTIC ACID Collection Time: 11/19/18  9:12 PM  
Result Value Ref Range Lactic Acid (POC) 2.67 (HH) 0.40 - 2.00 mmol/L POC LACTIC ACID  
 Collection Time: 11/20/18 12:58 AM  
Result Value Ref Range Lactic Acid (POC) 1.27 0.40 - 2.00 mmol/L  
HEMOGLOBIN A1C WITH EAG Collection Time: 11/20/18  2:30 AM  
Result Value Ref Range Hemoglobin A1c 7.5 (H) 4.2 - 5.6 % Est. average glucose 169 mg/dL SODIUM, UR, RANDOM Collection Time: 11/20/18  2:30 AM  
Result Value Ref Range Sodium,urine random 90 20 - 110 MMOL/L  
CREATININE, UR, RANDOM Collection Time: 11/20/18  2:30 AM  
Result Value Ref Range Creatinine, urine 31.90 30 - 125 mg/dL EOSINOPHILS, URINE Collection Time: 11/20/18  2:30 AM  
Result Value Ref Range Eosinophils,urine RARE    
LEGIONELLA PNEUMOPHILA AG, URINE Collection Time: 11/20/18  2:30 AM  
Result Value Ref Range Legionella Ag, urine NEGATIVE  NEG    
STREP PNEUMO AG, URINE Collection Time: 11/20/18  2:30 AM  
Result Value Ref Range Strep pneumo Ag, urine NEGATIVE  NEG    
GLUCOSE, POC Collection Time: 11/20/18  2:41 AM  
Result Value Ref Range Glucose (POC) 78 70 - 110 mg/dL Dorise Crate Collection Time: 11/20/18  6:00 AM  
Result Value Ref Range Vancomycin, random 10.8 5.0 - 40.0 UG/ML  
CBC WITH AUTOMATED DIFF Collection Time: 11/20/18  6:00 AM  
Result Value Ref Range WBC 25.2 (H) 4.6 - 13.2 K/uL  
 RBC 3.81 (L) 4.70 - 5.50 M/uL  
 HGB 11.1 (L) 13.0 - 16.0 g/dL HCT 33.2 (L) 36.0 - 48.0 % MCV 87.1 74.0 - 97.0 FL  
 MCH 29.1 24.0 - 34.0 PG  
 MCHC 33.4 31.0 - 37.0 g/dL  
 RDW 14.7 (H) 11.6 - 14.5 % PLATELET 040 385 - 737 K/uL MPV 11.0 9.2 - 11.8 FL  
 NEUTROPHILS 82 (H) 42 - 75 % BAND NEUTROPHILS 9 (H) 0 - 5 % LYMPHOCYTES 6 (L) 20 - 51 % MONOCYTES 3 2 - 9 % EOSINOPHILS 0 0 - 5 % BASOPHILS 0 0 - 3 %  
 ABS. NEUTROPHILS 22.9 (H) 1.8 - 8.0 K/UL  
 ABS. LYMPHOCYTES 1.5 0.8 - 3.5 K/UL  
 ABS. MONOCYTES 0.8 0 - 1.0 K/UL  
 ABS. EOSINOPHILS 0.0 0.0 - 0.4 K/UL  
 ABS. BASOPHILS 0.0 0.0 - 0.06 K/UL  
 DF MANUAL PLATELET COMMENTS ADEQUATE PLATELETS    
 RBC COMMENTS ANISOCYTOSIS 
1+ MAGNESIUM Collection Time: 11/20/18  6:00 AM  
Result Value Ref Range Magnesium 1.8 1.6 - 2.6 mg/dL PHOSPHORUS Collection Time: 11/20/18  6:00 AM  
Result Value Ref Range Phosphorus 2.7 2.5 - 4.9 MG/DL  
LACTIC ACID Collection Time: 11/20/18  6:00 AM  
Result Value Ref Range Lactic acid 1.3 0.4 - 2.0 MMOL/L  
METABOLIC PANEL, COMPREHENSIVE Collection Time: 11/20/18  6:00 AM  
Result Value Ref Range Sodium 145 136 - 145 mmol/L Potassium 3.7 3.5 - 5.5 mmol/L Chloride 113 (H) 100 - 108 mmol/L  
 CO2 22 21 - 32 mmol/L Anion gap 10 3.0 - 18 mmol/L Glucose 71 (L) 74 - 99 mg/dL BUN 20 (H) 7.0 - 18 MG/DL Creatinine 1.25 0.6 - 1.3 MG/DL  
 BUN/Creatinine ratio 16 12 - 20 GFR est AA >60 >60 ml/min/1.73m2 GFR est non-AA 55 (L) >60 ml/min/1.73m2 Calcium 7.9 (L) 8.5 - 10.1 MG/DL Bilirubin, total 1.2 (H) 0.2 - 1.0 MG/DL  
 ALT (SGPT) 17 16 - 61 U/L  
 AST (SGOT) 23 15 - 37 U/L Alk. phosphatase 55 45 - 117 U/L Protein, total 5.7 (L) 6.4 - 8.2 g/dL Albumin 2.5 (L) 3.4 - 5.0 g/dL Globulin 3.2 2.0 - 4.0 g/dL A-G Ratio 0.8 0.8 - 1.7 LACTIC ACID Collection Time: 11/20/18 11:52 AM  
Result Value Ref Range Lactic acid 1.3 0.4 - 2.0 MMOL/L  
GLUCOSE, POC Collection Time: 11/20/18 12:07 PM  
Result Value Ref Range Glucose (POC) 85 70 - 110 mg/dL Additional Data Reviewed:   
 
Signed By: Sp Gallegos MD   
 November 20, 2018 1:33 PM

## 2018-11-20 NOTE — ROUTINE PROCESS
Bedside and Verbal shift change report given to Kyle Chapin (oncoming nurse) by Deshaun Christie RN (offgoing nurse). Report included the following information SBAR, Intake/Output and Recent Results.

## 2018-11-20 NOTE — ROUTINE PROCESS
Assumed patient care at 1100, report received from Amrit. Patient unable to verbally communicate. Administered Swapna enema per one time admission orders. Patient abd distended. No insulin coverage required. Patient passed bedside swallow per speech and ok for meal trays.

## 2018-11-20 NOTE — ED NOTES
7:00 PM :Pt care assumed from Dr. Alejandro Chilel , ED provider. Pt complaint(s), current treatment plan, progression and available diagnostic results have been discussed thoroughly. Rounding occurred: yes Intended Disposition: TBD Pending diagnostic reports and/or labs (please list): CT 
 
7:00 PM 
Patient was seen and evaluated. Dr. Irineo Baker, the resident, states that the patient appears to have a septic shock with reassuring urine and chest x-ray. Patient's abdomen is distended and may be source of infection. I had a long talk with the patient's sister who seems to be the medical decision maker as the patient is deaf. She does not want to have heroic measures, CPR, or intubation to be done. I discussed the possibility of needing surgical intervention and will decide talking about that after the CT scan. 
 
8:54 PM 
Patient's CT scan resulted and showed that he has large fecal impaction and likely aspiration pneumonia. Patient has no hypoxia, and his blood pressure has improved. However, lactate has elevated, and I will discuss admission to the ICU. Sepsis 3-6 hour reevaluation and exam:   
Reevaluation: 
Vital Signs:  
Patient Vitals for the past 12 hrs: 
 Temp Pulse Resp BP SpO2  
11/19/18 2030  (!) 103 17 99/55 99 % 11/19/18 2015  (!) 108 22 101/49 98 % 11/19/18 1945  (!) 108 21 99/57 99 % 11/19/18 1930  (!) 110 20 96/54 98 % 11/19/18 1915  (!) 114 27 102/57 100 % 11/19/18 1900  (!) 120 27 113/66 100 % 11/19/18 1845  (!) 127 29 142/76 100 % 11/19/18 1830  (!) 127 28 138/79 100 % 11/19/18 1815  (!) 128 26 128/71 100 % 11/19/18 1800  (!) 129 27 133/63 100 % 11/19/18 1745  (!) 127 23 129/70 100 % 11/19/18 1730  99 21 122/64 100 % 11/19/18 1715  (!) 108 23 107/58 100 % 11/19/18 1700 (!) 102 °F (38.9 °C) (!) 104 24 130/66 99 % 11/19/18 1645  (!) 111 26 109/51 99 % 11/19/18 1637 (!) 101 °F (38.3 °C) (!) 108 29 122/60 97 % Cardiopulmonary assessment: RESP: Coarse breath sounds CV: Tachy Capillary refill: less than 2 seconds Peripheral pulse:   Equal  
Skin exam: 
Skin color: Pink Skin Turgor: Normal 
Sepsis 3-6 hour reevaluation and exam performed at 9:45 PM. 9:59 PM Consult: I discussed care with Dr. Ed Carrero (hospitalist). It was a standard discussion including patient history, chief complaint, available diagnostic results, and predicted treatment course. Dr. Ed Carrero will admit patient. Pt accepted to the ICU by Dr. Ashley Abdi. Pt is more alert and considered central line but MAP 74 now. Will continue to monitor and proceed with ICU admit. Rosanna Starr, DO 10:13 PM 
 
CRITICAL CARE: 
10:13 PM 
I have spent 120 minutes of critical care time involved in lab review, consultations with specialist, family decision-making, and documentation. During this entire length of time I was immediately available to the patient. Critical Care: The reason for providing this level of medical care for this critically ill patient was due a critical illness that impaired one or more vital organ systems such that there was a high probability of imminent or life threatening deterioration in the patients condition. This care involved high complexity decision making to assess, manipulate, and support vital system functions, to treat this degreee vital organ system failure and to prevent further life threatening deterioration of the patients condition. Scribe Attestation Alix Cowan acting as a scribe for and in the presence of Bharti Arias MD     
November 19, 2018 at 8:56 PM 
    
Provider Attestation:     
I personally performed the services described in the documentation, reviewed the documentation, as recorded by the scribe in my presence, and it accurately and completely records my words and actions.  November 19, 2018 at 8:56 PM - Bharti Arias MD

## 2018-11-20 NOTE — PROGRESS NOTES
conducted a Follow up consultation and Spiritual Assessment for Marisol Bonilla, who is a 80 y. o.,male. The  provided the following Interventions: 
Continued the relationship of care and support. Patient's sister Lisseth Clemente is at bedside. 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 He is not  and has been living with her until 3 years ago when he moved to a nursing facility. Patient was alert on and off during conversation. He did not speak at all during the visit. They are both Jewish and are natives of [facility]. Offered prayer and assurance of continued prayer on patient and family's behalf. Chart reviewed. The following outcomes were achieved: 
Sister expressed gratitude for pastoral care visit. Assessment: 
There are no further spiritual or Religion issues which require Spiritual Care Services interventions at this time. Plan: 
Chaplains will continue to follow and provide pastoral care as needed or requested.  recommends bedside caregivers page  on duty if patient shows signs of acute spiritual or emotional distress. The Rev. Beryl Denny [address] Spiritual Care Services [address] [facility],[address] [facility] [phone] / [facility] [phone]

## 2018-11-20 NOTE — ED NOTES
TRANSFER - OUT REPORT: 
 
Verbal report given to Lisa Downing RN (name) on Albert [place]  being transferred to [facility](unit) for routine progression of care Report consisted of patients Situation, Background, Assessment and  
Recommendations(SBAR). Information from the following report(s) SBAR, ED Summary, Intake/Output and MAR was reviewed with the receiving nurse. Lines:  
Peripheral IV 11/19/18 Left Forearm (Active) Site Assessment Clean, dry, & intact 11/19/2018  5:05 PM  
Phlebitis Assessment 0 11/19/2018  5:05 PM  
Infiltration Assessment 0 11/19/2018  5:05 PM  
Dressing Status Clean, dry, & intact 11/19/2018  5:05 PM  
Dressing Type Transparent 11/19/2018  5:05 PM  
Hub Color/Line Status Flushed 11/19/2018  5:05 PM  
Action Taken Blood drawn 11/19/2018  5:05 PM  
   
Peripheral IV 11/19/18 Right Wrist (Active) Site Assessment Clean, dry, & intact 11/19/2018  4:30 PM  
Phlebitis Assessment 0 11/19/2018  4:30 PM  
Dressing Status Clean, dry, & intact 11/19/2018  4:30 PM  
Dressing Type Transparent 11/19/2018  4:30 PM  
Hub Color/Line Status Green 11/19/2018  4:30 PM  
Action Taken Blood drawn 11/19/2018  4:30 PM  
  
 
Opportunity for questions and clarification was provided. Patient transported with: 
 Monitor

## 2018-11-20 NOTE — PROGRESS NOTES
Problem: Dysphagia (Adult) Goal: *Acute Goals and Plan of Care (Insert Text) Patient will: 1. Tolerate PO trials with 0 s/s overt distress in 4/5 trials 2. Utilize compensatory swallow strategies/maneuvers (decrease bite/sip, size/rate, alt. liq/sol) with min cues in 4/5 trials Recommend:  
Soft solid diet with thin liquids Meds as tolerated Aspiration precautions HOB >45 degrees during all intake and for at least 30 min after po Small bites/sips, Slow rate of intake Speech LAnguage Pathology bedside swallow evaluation/treatment Patient: Frankie Telles (36 y.o. male) Date: 11/20/2018 Primary Diagnosis: Septic shock ([facility] Aspiration pneumonia ([facility] Sepsis ([facility] Precautions: Aspiration ASSESSMENT : 
Based on the objective data described below, the patient presents with mild oral and suspected mild pharyngeal dysphagia. Pt seen with family member present at b/s who reports pt tolerated breakfast this am with no difficulties and normally feeds himself at the nursing home with no difficulties. Pt drowsy, eyes opening to stimulus. Pt unable to verbalize orientation information. Oral mech examination revealed decreased lingual strength/control and limited dentition (bottom dentures lost per family member). Pt tolerating thin liquids + straw via consecutive swallows and single sips with timely swallow initiation and adequate laryngeal elevation to palpation. Pt demo labored mastication with regular solids; however, able to clear given increased time and liquid wash. No overt s/sx aspiration noted across evaluation. If silent aspiration a concern, pt may benefit from MBS to rule out. Treatment performed following evaluation (see below). Will follow for further dysphagia management. Patient will benefit from skilled intervention to address the above impairments. Patients rehabilitation potential is considered to be Good Factors which may influence rehabilitation potential include:  
[]            None noted [x]            Mental ability/status [x]            Medical condition []            Home/family situation and support systems 
[]            Safety awareness 
[]            Pain tolerance/management []            Other: PLAN : 
Recommendations and Planned Interventions: As above Frequency/Duration: Patient will be followed by speech-language pathology 1-2 times per day/4-7 days per week to address goals. Discharge Recommendations: Herminio Jacobs SUBJECTIVE:  
Patient nonverbal during evaluation OBJECTIVE:  
 
Past Medical History:  
Diagnosis Date  Deaf   
 bilateral  
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  
Procedure Laterality Date [address] Prior Level of Function/Home Situation: 
Home Situation Home Environment: Long term care One/Two Story Residence: One story Living Alone: No 
Support Systems: Skilled nursing facility Patient Expects to be Discharged to[de-identified] Skilled nursing facility Current DME Used/Available at Home: None Diet prior to admission: Regular/thin Current Diet:  Regular/thin; recommend change to soft solid/thin Cognitive and Communication Status: 
Neurologic State: Alert Orientation Level: Unable to verbalize Cognition: Follows commands Oral Assessment: 
Oral Assessment Labial: No impairment Dentition: Upper dentures(Missing bottom dentures ) Lingual: Decreased strength Velum: Unable to visualize Mandible: No impairment P.O. Trials: 
Patient Position: 45 at [facility] Vocal quality prior to P.O.: Low volume Consistency Presented: Solid; Thin liquid How Presented: SLP-fed/presented;Straw;Successive swallows Bolus Acceptance: No impairment Bolus Formation/Control: Impaired Type of Impairment: Mastication;Delayed Propulsion: Delayed (# of seconds) Oral Residue: Lingual;Less than 10% of bolus Initiation of Swallow: No impairment Laryngeal Elevation: Functional 
Aspiration Signs/Symptoms: None Pharyngeal Phase Characteristics: Poor endurance Effective Modifications: Small sips and bites; Alternate liquids/solids Cues for Modifications: Minimal 
Oral Phase Severity: Mild Pharyngeal Phase Severity : Mild GCODESwallowing:  Swallow Current Status CI= 1-19%  Swallow Goal Status CI= 1-19% The severity rating is based on the following outcomes:   
Clinical Judgment Pain: 
Pt nonverbal; unable to report Treatment Performed Following Evaluation: 
Training and education provided related to anatomy/physiology of oropharyngeal swallow, diet recs, aspiration risk/precautions, positioning and compensatory strategies. Pt's family highly engaged in training and education. Will follow. After treatment:  
[]            Patient left in no apparent distress sitting up in chair 
[x]            Patient left in no apparent distress in bed 
[x]            Call bell left within reach [x]            Nursing notified 
[x]            Caregiver present 
[]            Bed alarm activated COMMUNICATION/EDUCATION:  
[x]            SLP educated pt/family with regard to compensatory swallow strategies and 
     aspiration/reflux precautions including: small bites/sips, 
     alternate liquids/solids, decrease feeding rate, HOB > 45 with all po, and upright in bed at 30 degrees after po for at least 45 minutes. [x]            Patient/family have participated as able in goal setting and plan of care. []            Patient/family agree to work toward stated goals and plan of care.  
[]            Patient understands intent and goals of therapy; neutral about participation. []            Patient is unable to participate in goal setting and plan of care. Thank you for this referral. 
Alejandro Narayanan M.S., CCC-SLP Speech-Language Pathologist

## 2018-11-20 NOTE — H&P
Hospitalist Admission NoteNAME: Ronald Francisco :  1930 MRN:  [id number] Date/Time of admission:  2018 11:05 PM 
 
Patient PCP: Shan Lowry MD 
________________________________________________________________________ My assessment of this patient's clinical condition and my plan of care is as follows. Assessment / Plan: 1. Severe Sepsis with etiology likely pulmonary (not shock d/t response to fluids) 2. Acute kidney injury d/t #5 3. Hyperbilirubinemia 4. Fecal impaction with subsequent postrenal obstruction 5. Bladder outlet obstruction d/t #4 6. Bilateral bronchial aspiration pneumonia, bacterial 
7. Microscopic hematuria 8. Known BPH 
9. Type 2 DM 10. Deaf 11. H/o dementia 1. Admit to [facility] for now with broad spectrum abx for pulm coverage; follow cultures and continue to hydrate 2. Follow cmp and if bili rises more, consider w/u for acute cholangitis - will be on zosyn which would cover such anyway. 3. Check urine lytes, eos, urine cytology, and reimage with ultrasound after resolve of impaction 4. Check rapid flu, urine legionella and strep ag 5. Prn enemas and stool softeners 6. ST consult to assess for risk of aspiration 7. Avoid nephrotoxic medications 8. Correctional coverage for DM 9. DNR/DNI Code Status: DNR/DNI Surrogate Decision Maker: tbd 
 
DVT Prophylaxis: sc hep GI Prophylaxis: not indicated Subjective: CHIEF COMPLAINT: increased lethargy HISTORY OF PRESENT ILLNESS:    
Juan Moya is a 80 y.o.  male who presents with increased lethargy and decreased responsiveness. Pt is a resident of a NH with the pmhx of dementia, DM, deafness, bph, and HTN. Was noted to be less responsive over the past day and was brought to the ED and noted to be febrile and to show evidence of sepsis with fever, elevated wbc and bilateral pulmonary infiltrates.  Pt also had evidence of JIMBO and had about 2L of urine in his bladder relieved by hudson placement. CT a/p showed severe fecal impaction. There was not report of n/v, but it was noted that pt had bilateral infiltrates on CT scan and it was suggested to be aspiration. Pt has no pneumonia symptoms, but also has dementia and is advanced in age. Pt had a lactic acid of > 6 initially. He responded well to fluids. Per pt's wife, pt is DNR/DNI, but otherwise wants everything done. We were asked to admit for work up and evaluation of the above problems. Pt, himself, unable to provide any history. Past Medical History:  
Diagnosis Date  Deaf   
 bilateral  
 Deaf  Diabetes ([facility]  Hyperglycemia  Hypertension  Hypertrophy of prostate with urinary obstruction and other lower urinary tract symptoms (LUTS)  Retention of urine  Retention, urine  Scarlet fever 1946 left pt deaf  UTI (lower urinary tract infection) Past Surgical History:  
Procedure Laterality Date [address] Social History Tobacco Use  Smoking status: Never Smoker  Smokeless tobacco: Never Used Substance Use Topics  Alcohol use: No  
  Alcohol/week: 0.0 oz Family History Problem Relation Age of Onset  Hypertension Mother No Known Allergies Prior to Admission medications Medication Sig Start Date End Date Taking? Authorizing Provider  
predniSONE (DELTASONE) 10 mg tablet 60 mg PO daily, decrease 5 mg every 3 days until complete 6/26/17   Eveline Patel MD  
erythromycin (ILOTYCIN) ophthalmic ointment Thin film both eyes Q8 hrs x 5 days 6/26/17   Eveline Patel MD  
bisacodyl (DULCOLAX, BISACODYL,) 10 mg suppository Insert 10 mg into rectum daily as needed. Alen, MD Shan  
sodium phosphate (ENEMA) 19-7 gram/118 mL enema Insert 1 Enema into rectum as needed.     Shan Lowry MD  
glucagon (GLUCAGEN) 1 mg injection 1 mg by IntraVENous route as needed for Hypoglycemia. Other, MD hSan  
bisacodyl (DULCOLAX, BISACODYL,) 5 mg EC tablet Take 10 mg by mouth daily as needed for Constipation. Indications: Constipation, do not crush    Provider, Historical  
SITagliptin (JANUVIA) 25 mg tablet Take 25 mg by mouth daily. Do not crush    Provider, Historical  
insulin glargine (LANTUS) 100 unit/mL injection 18 Units by SubCUTAneous route nightly. Provider, Historical  
furosemide (LASIX) 20 mg tablet Take 20 mg by mouth daily. Indications: Edema    Provider, Historical  
atorvastatin (LIPITOR) 10 mg tablet Take 10 mg by mouth nightly. Indications: hypercholesterolemia    Provider, Historical  
insulin aspart (NOVOLOG) 100 unit/mL injection 1-10 Units by SubCUTAneous route Before breakfast, lunch, dinner and at bedtime. Sliding scale 0-200=0units 201-250=2units 251-350=4units 351-400=6units 401-450=8units 451+=10units and call MD    Provider, Historical  
levothyroxine (SYNTHROID) 25 mcg tablet Take 25 mcg by mouth Daily (before breakfast). Provider, Historical  
cholecalciferol, vitamin D3, 2,000 unit tab Take 1 Tab by mouth daily. Indications: VITAMIN D DEFICIENCY    Provider, Historical  
amLODIPine (NORVASC) 10 mg tablet Take 10 mg by mouth daily. 7/17/15   Provider, Historical  
acetaminophen (TYLENOL) 325 mg tablet Take 325 mg by mouth every six (6) hours as needed for Pain or Fever. Provider, Historical  
tamsulosin (FLOMAX) 0.4 mg capsule Take 0.4 mg by mouth nightly. Provider, Historical  
finasteride (PROSCAR) 5 mg tablet Take 5 mg by mouth daily. Provider, Historical  
lisinopril (PRINIVIL, ZESTRIL) 10 mg tablet Take 10 mg by mouth daily. Provider, Historical  
 
 
REVIEW OF SYSTEMS:    
I am not able to complete the review of systems because: The patient is intubated and sedated  
x The patient has altered mental status due to his acute medical problems The patient has baseline aphasia from prior stroke(s) x The patient has baseline dementia and is not reliable historian The patient is in acute medical distress and unable to provide information Total of 12 systems reviewed as follows:   
   POSITIVE= bolded text  Negative = text not underlined General:  fever, chills, sweats, generalized weakness, weight loss/gain,  
   loss of appetite Eyes:    blurred vision, eye pain, loss of vision, double vision ENT:    rhinorrhea, pharyngitis Respiratory:   cough, sputum production, SOB, GRAY, wheezing, pleuritic pain  
Cardiology:   chest pain, palpitations, orthopnea, PND, edema, syncope Gastrointestinal:  abdominal pain , N/V, diarrhea, dysphagia, constipation, bleeding Genitourinary:  frequency, urgency, dysuria, hematuria, incontinence Muskuloskeletal :  arthralgia, myalgia, back pain Hematology:  easy bruising, nose or gum bleeding, lymphadenopathy Dermatological: rash, ulceration, pruritis, color change / jaundice Endocrine:   hot flashes or polydipsia Neurological:  headache, dizziness, confusion, focal weakness, paresthesia, Speech difficulties, memory loss, gait difficulty Psychological: Feelings of anxiety, depression, agitation Objective: VITALS:   
Visit Vitals BP 99/55 Pulse (!) 103 Temp (!) 102 °F (38.9 °C) Resp 17 Ht 5' 3\" (1.6 m) Wt 75 kg (165 lb 4.8 oz) SpO2 99% BMI 29.28 kg/m² PHYSICAL EXAM: 
 
General:    Somnolent and appears chronically ill with toxic appearance HEENT: Atraumatic, anicteric sclerae, pink conjunctivae No oral ulcers, mucosa moist, throat clear, dentition fair Neck:  Supple, symmetrical,  thyroid: non tender Lungs:   Clear to auscultation bilaterally. No Wheezing or Rhonchi. No rales. Chest wall:  No tenderness  No Accessory muscle use. Heart:   Regular  rhythm,  No  murmur   No edema Abdomen:   Soft, non-tender. Not distended. Bowel sounds normal 
Extremities: No cyanosis.   No clubbing,   
 Skin turgor normal, Capillary refill normal, Radial dial pulse 2+ Skin:     Not pale. Not Jaundiced  No rashes Psych:  Unable to assess Neurologic: EOMs intact. No facial asymmetry. somnolent.  
 
_______________________________________________________________________ Care Plan discussed with: 
  Comments Patient Family RN Care Manager Consultant:     
_______________________________________________________________________ Expected  Disposition:  
Home with Family HH/PT/OT/RN   
SNF/LTC x  
LEE   
________________________________________________________________________ TOTAL TIME:  60 Minutes Critical Care Provided     Minutes non procedure based Comments  
 x Reviewed previous records  
>50% of visit spent in counseling and coordination of care  Discussion with patient and/or family and questions answered 
  
 
________________________________________________________________________ Procedures: see electronic medical records for all procedures/Xrays and details which were not copied into this note but were reviewed prior to creation of Plan. LAB DATA REVIEWED:   
Recent Results (from the past 24 hour(s)) CBC WITH AUTOMATED DIFF Collection Time: 11/19/18  4:15 PM  
Result Value Ref Range WBC 16.3 (H) 4.6 - 13.2 K/uL  
 RBC 4.75 4.70 - 5.50 M/uL  
 HGB 14.0 13.0 - 16.0 g/dL HCT 41.1 36.0 - 48.0 % MCV 86.5 74.0 - 97.0 FL  
 MCH 29.5 24.0 - 34.0 PG  
 MCHC 34.1 31.0 - 37.0 g/dL  
 RDW 14.3 11.6 - 14.5 % PLATELET 552 924 - 813 K/uL MPV 10.9 9.2 - 11.8 FL  
 NEUTROPHILS 81 (H) 42 - 75 % BAND NEUTROPHILS 14 (H) 0 - 5 % LYMPHOCYTES 3 (L) 20 - 51 % MONOCYTES 2 2 - 9 % EOSINOPHILS 0 0 - 5 % BASOPHILS 0 0 - 3 %  
 ABS. NEUTROPHILS 15.5 (H) 1.8 - 8.0 K/UL  
 ABS. LYMPHOCYTES 0.5 (L) 0.8 - 3.5 K/UL  
 ABS. MONOCYTES 0.3 0 - 1.0 K/UL  
 ABS. EOSINOPHILS 0.0 0.0 - 0.4 K/UL  
 ABS. BASOPHILS 0.0 0.0 - 0.06 K/UL  
 DF MANUAL PLATELET COMMENTS ADEQUATE PLATELETS    
 RBC COMMENTS NORMOCYTIC, NORMOCHROMIC METABOLIC PANEL, COMPREHENSIVE Collection Time: 11/19/18  4:15 PM  
Result Value Ref Range Sodium 144 136 - 145 mmol/L Potassium 3.4 (L) 3.5 - 5.5 mmol/L Chloride 107 100 - 108 mmol/L  
 CO2 22 21 - 32 mmol/L Anion gap 15 3.0 - 18 mmol/L Glucose 139 (H) 74 - 99 mg/dL BUN 22 (H) 7.0 - 18 MG/DL Creatinine 1.95 (H) 0.6 - 1.3 MG/DL  
 BUN/Creatinine ratio 11 (L) 12 - 20 GFR est AA 40 (L) >60 ml/min/1.73m2 GFR est non-AA 33 (L) >60 ml/min/1.73m2 Calcium 9.4 8.5 - 10.1 MG/DL Bilirubin, total 1.4 (H) 0.2 - 1.0 MG/DL  
 ALT (SGPT) 25 16 - 61 U/L  
 AST (SGOT) 22 15 - 37 U/L Alk. phosphatase 82 45 - 117 U/L Protein, total 7.7 6.4 - 8.2 g/dL Albumin 3.7 3.4 - 5.0 g/dL Globulin 4.0 2.0 - 4.0 g/dL A-G Ratio 0.9 0.8 - 1.7 POC LACTIC ACID Collection Time: 11/19/18  4:43 PM  
Result Value Ref Range Lactic Acid (POC) 6.30 (HH) 0.40 - 2.00 mmol/L  
URINALYSIS W/ RFLX MICROSCOPIC Collection Time: 11/19/18  4:45 PM  
Result Value Ref Range Color YELLOW Appearance HAZY Specific gravity 1.015 1.003 - 1.030    
 pH (UA) 5.0 5.0 - 8.0 Protein NEGATIVE  NEG mg/dL Glucose NEGATIVE  NEG mg/dL Ketone NEGATIVE  NEG mg/dL Bilirubin NEGATIVE  NEG Blood MODERATE (A) NEG Urobilinogen 0.2 0.2 - 1.0 EU/dL Nitrites NEGATIVE  NEG Leukocyte Esterase NEGATIVE  NEG    
URINE MICROSCOPIC ONLY Collection Time: 11/19/18  4:45 PM  
Result Value Ref Range WBC 0 to 3 0 - 4 /hpf  
 RBC 0 to 3 0 - 5 /hpf Epithelial cells NEGATIVE  0 - 5 /lpf Bacteria 3+ (A) NEG /hpf  
EKG, 12 LEAD, INITIAL Collection Time: 11/19/18  4:46 PM  
Result Value Ref Range Ventricular Rate 105 BPM  
 Atrial Rate 105 BPM  
 P-R Interval 184 ms QRS Duration 96 ms  
 Q-T Interval 328 ms QTC Calculation (Bezet) 433 ms Calculated P Axis 43 degrees Calculated R Axis -63 degrees Calculated T Axis 79 degrees Diagnosis Sinus tachycardia Left anterior fascicular block Abnormal ECG No previous ECGs available POC LACTIC ACID Collection Time: 11/19/18  4:50 PM  
Result Value Ref Range Lactic Acid (POC) 6.58 (HH) 0.40 - 2.00 mmol/L POC LACTIC ACID Collection Time: 11/19/18  9:12 PM  
Result Value Ref Range Lactic Acid (POC) 2.67 (HH) 0.40 - 2.00 mmol/L Melissa Hernandez MD 
Internal Medicine Hospitalist Division

## 2018-11-20 NOTE — ED NOTES
Assumed care of pt, pt came for sepsis from [facility] due to altered mental status, unsure of baseline, pt is deaf, has urinary obstruction from constipation, hudson in place curretnly, pt receiving IV fluids, VSS, pt able to follow simple commands, nod yes or no and answer simple questions, pt alert and oriented to self only.

## 2018-11-20 NOTE — PROGRESS NOTES
NUTRITION Nursing Referral: EN/PN PTA 
  
RECOMMENDATIONS / PLAN:  
 
- Add supplements: Glucerna Shake TID.  
- Continue RD inpatient monitoring and evaluation. NUTRITION INTERVENTIONS & DIAGNOSIS:  
 
[x] Meals/snacks: modified composition 
[x] Medical food supplement therapy: initiate Nutrition Diagnosis: Inadequate oral intake related to altered mentation and decreased appetite as evidenced by pt consuming 50% or less of recent meals. ASSESSMENT:  
 
Pt with altered mentation, eaten 25% or lunch meal.  
 
Average po intake adequate to meet patients estimated nutritional needs:   [] Yes     [x] No   [] Unable to determine at this time Diet: DIET DIABETIC CONSISTENT CARB Soft Solids Food Allergies: NKFA Current Appetite:   [] Good     [x] Fair     [] Poor     [] Other: 
Appetite/meal intake prior to admission:   [x] Good usually, poor po x 1 day     [] Fair     [] Poor     [] Other: 
Feeding Limitations:  [x] Swallowing difficulty: SLP following    [] Chewing difficulty    [] Other: 
Current Meal Intake: No data found. BM: 11/20 Skin Integrity: WDL Edema:   [] No     [x] Yes Pertinent Medications: Reviewed Recent Labs  
  11/20/18 
0600 11/19/18 
1615  144  
K 3.7 3.4*  
* 107 CO2 22 22 GLU 71* 139* BUN 20* 22* CREA 1.25 1.95* CA 7.9* 9.4 MG 1.8 2.1 PHOS 2.7 1.1* ALB 2.5* 3.7 SGOT 23 22 ALT 17 25 Intake/Output Summary (Last 24 hours) at 11/20/2018 1530 Last data filed at 11/20/2018 1221 Gross per 24 hour Intake 5228.33 ml Output 1700 ml Net 3528.33 ml Anthropometrics: 
Ht Readings from Last 1 Encounters:  
11/19/18 5' 3\" (1.6 m) Last 3 Recorded Weights in this Encounter 11/19/18 1637 11/20/18 0320 Weight: 75 kg (165 lb 4.8 oz) 79 kg (174 lb 2.6 oz) Body mass index is 30.85 kg/m². Obese, Class I Weight History: weight gain per chart history Weight Metrics 11/20/2018 6/20/2017 6/11/2017 5/18/2017 9/21/2015 7/24/2015 Weight 174 lb 2.6 oz 165 lb 165 lb 146 lb 146 lb 146 lb BMI 30.85 kg/m2 28.32 kg/m2 28.32 kg/m2 25.86 kg/m2 25.87 kg/m2 25.87 kg/m2 Admitting Diagnosis: Septic shock ([facility] Asp[facility] ([facility] Sepsis ([facility] Pertinent PMHx: DM, HTN, prostate hypertrophy with urinary obstruction Education Needs:        [x] None identified  [] Identified - Not appropriate at this time  []  Identified and addressed - refer to education log Learning Limitations:   [] None identified  [x] Identified: dementia, deaf, blind Cultural, Shinto & ethnic food preferences:  [x] None identified    [] Identified and addressed ESTIMATED NUTRITION NEEDS:  
 
Calories: 8864-9747 kcal (MSJx1.2-1.3) based on  [x] Actual BW 79 kg     [] IBW Protein: 67-79 gm (0.8-1 gm/kg) based on  [x] Actual BW      [] IBW Fluid: 1 mL/kcal 
  
MONITORING & EVALUATION:  
 
Nutrition Goal(s): 1. Po intake of meals will meet >75% of patient estimated nutritional needs within the next 7 days. Outcome:  [] Met/Ongoing    []  Not Met    [x] New/Initial Goal  
 
Monitoring:   [x] Food and beverage intake   [x] Diet order   [x] Nutrition-focused physical findings   [x] Treatment/therapy   [] Weight   [] Enteral nutrition intake Previous Recommendations (for follow-up assessments only):     []   Implemented       []   Not Implemented (RD to address)      [] No Longer Appropriate     [] No Recommendation Made Discharge Planning: cardiac, diabetic diet, soft solids per SLP [x] Participated in care planning, discharge planning, & interdisciplinary rounds as appropriate Frida Boykin RD, [address]  Pager: [phone]

## 2018-11-20 NOTE — PROGRESS NOTES
conducted an initial consultation and Spiritual Assessment for Sukumar Yun, who is a 80 y. o.,male. Patients Primary Language is: [place]. According to the patients EMR Bahai Affiliation is: Jain. The reason the Patient came to the hospital is:  
Patient Active Problem List  
 Diagnosis Date Noted  Aspiration pneumonia ([facility] 11/19/2018  Septic shock ([facility] 11/19/2018  Malignant otitis externa of both ears 06/20/2017  Malignant otitis externa 06/11/2017  Urinary retention 07/24/2015  BPH (benign prostatic hypertrophy) with urinary obstruction 07/24/2015 The  provided the following Interventions: 
Initiated a relationship of care and support. Offered prayer and assurance of continued prayers on patient's behalf. Chart reviewed. The following outcomes where achieved: 
Assurance of prayer. Assessment: 
There are no spiritual or Protestant issues which require intervention at this time. Plan: 
Chaplains will continue to follow and will provide pastoral care on an as needed/requested basis.  recommends bedside caregivers page  on duty if patient shows signs of acute spiritual or emotional distress. Chaplain Delmer Pollard Spiritual Care  
[phone]

## 2018-11-20 NOTE — PROGRESS NOTES
Reason for Admission:  Septic shock ([facility] Aspiration pneumonia ([facility] Sepsis ([facility] RRAT Score:    8 Plan for utilizing home health:    Pt is a resident of [address] [place] [address] for 3 years, will return upon discharge. Likelihood of Readmission:   LOW Transition of Care Plan:         
 
Pt is nonverbal, deaf, and has dementia. Initial assessment completed with relative(s), pt's sister, POA. Face sheet information confirmed:  yes. The patient requests we communicate with sister in reference to medical care. This patient lives at [address] [place] [address]. Patient is not able to navigate steps as needed. Prior to hospitalization, patient was considered to be independent : no . If not independent,  patient needs assist with :  ALL NEEDS  Cognitive status of patient: disoriented. Patient has a current ACP document on file: yes Pt will need ambulance transport upon discharge. Patient is not currently active with home health. Patient has ever stayed in a skilled nursing facility or rehab. Was  stay within last 60 days : yes, currently resident of [address] [place] [address] This patient is on dialysis :no 
 
 Freedom of choice signed: yes, for [address] [place] [address]. Currently, the discharge plan is LTAC. Care Management Interventions PCP Verified by CM: Yes 
Palliative Care Criteria Met (RRAT>21 & CHF Dx)?: No 
Mode of Transport at Discharge: Other (see comment)(ambulance) [facility] of [facility] Consult (CM Consult[facility] 
Discharge Durable Medical Equipment: No 
Physical Therapy Consult: No 
Occupational Therapy Consult: No 
Speech Therapy Consult: No 
Current Support Network: [address] [place] [address] Confirm Follow Up Transport: Other (see comment)(ambulance) Plan discussed with Pt/Family/Caregiver: Yes Freedom of Choice Offered: Yes [employer] Information Provided?: No 
Discharge Location Discharge Placement: [facility] Case Management [phone]

## 2018-11-21 PROBLEM — K56.41 FECAL IMPACTION (HCC): Status: ACTIVE | Noted: 2018-11-21

## 2018-11-21 LAB
ANION GAP SERPL CALC-SCNC: 10 MMOL/L (ref 3–18)
ATRIAL RATE: 105 BPM
BACTERIA SPEC CULT: ABNORMAL
BASOPHILS # BLD: 0 K/UL (ref 0–0.06)
BASOPHILS NFR BLD: 0 % (ref 0–3)
BUN SERPL-MCNC: 18 MG/DL (ref 7–18)
BUN/CREAT SERPL: 15 (ref 12–20)
CALCIUM SERPL-MCNC: 8.3 MG/DL (ref 8.5–10.1)
CALCULATED P AXIS, ECG09: 43 DEGREES
CALCULATED R AXIS, ECG10: -63 DEGREES
CALCULATED T AXIS, ECG11: 79 DEGREES
CHLORIDE SERPL-SCNC: 114 MMOL/L (ref 100–108)
CO2 SERPL-SCNC: 23 MMOL/L (ref 21–32)
CREAT SERPL-MCNC: 1.2 MG/DL (ref 0.6–1.3)
DIAGNOSIS, 93000: NORMAL
DIFFERENTIAL METHOD BLD: ABNORMAL
EOSINOPHIL # BLD: 0.7 K/UL (ref 0–0.4)
EOSINOPHIL NFR BLD: 3 % (ref 0–5)
ERYTHROCYTE [DISTWIDTH] IN BLOOD BY AUTOMATED COUNT: 14.7 % (ref 11.6–14.5)
FLUAV AG NPH QL IA: NEGATIVE
FLUBV AG NOSE QL IA: NEGATIVE
GLUCOSE BLD STRIP.AUTO-MCNC: 101 MG/DL (ref 70–110)
GLUCOSE BLD STRIP.AUTO-MCNC: 129 MG/DL (ref 70–110)
GLUCOSE BLD STRIP.AUTO-MCNC: 90 MG/DL (ref 70–110)
GLUCOSE BLD STRIP.AUTO-MCNC: 95 MG/DL (ref 70–110)
GLUCOSE SERPL-MCNC: 77 MG/DL (ref 74–99)
HCT VFR BLD AUTO: 36 % (ref 36–48)
HGB BLD-MCNC: 11.5 G/DL (ref 13–16)
LACTATE SERPL-SCNC: 1 MMOL/L (ref 0.4–2)
LACTATE SERPL-SCNC: 1.2 MMOL/L (ref 0.4–2)
LACTATE SERPL-SCNC: 1.2 MMOL/L (ref 0.4–2)
LYMPHOCYTES # BLD: 1.3 K/UL (ref 0.8–3.5)
LYMPHOCYTES NFR BLD: 6 % (ref 20–51)
MAGNESIUM SERPL-MCNC: 2 MG/DL (ref 1.6–2.6)
MCH RBC QN AUTO: 28.3 PG (ref 24–34)
MCHC RBC AUTO-ENTMCNC: 31.9 G/DL (ref 31–37)
MCV RBC AUTO: 88.7 FL (ref 74–97)
MONOCYTES # BLD: 1.3 K/UL (ref 0–1)
MONOCYTES NFR BLD: 6 % (ref 2–9)
NEUTS BAND NFR BLD MANUAL: 3 % (ref 0–5)
NEUTS SEG # BLD: 19.1 K/UL (ref 1.8–8)
NEUTS SEG NFR BLD: 82 % (ref 42–75)
P-R INTERVAL, ECG05: 184 MS
PHOSPHATE SERPL-MCNC: 2.2 MG/DL (ref 2.5–4.9)
PLATELET # BLD AUTO: 162 K/UL (ref 135–420)
PLATELET COMMENTS,PCOM: ABNORMAL
PMV BLD AUTO: 10.9 FL (ref 9.2–11.8)
POTASSIUM SERPL-SCNC: 3.6 MMOL/L (ref 3.5–5.5)
Q-T INTERVAL, ECG07: 328 MS
QRS DURATION, ECG06: 96 MS
QTC CALCULATION (BEZET), ECG08: 433 MS
RBC # BLD AUTO: 4.06 M/UL (ref 4.7–5.5)
RBC MORPH BLD: ABNORMAL
SERVICE CMNT-IMP: ABNORMAL
SODIUM SERPL-SCNC: 147 MMOL/L (ref 136–145)
VANCOMYCIN SERPL-MCNC: 10.8 UG/ML (ref 5–40)
VENTRICULAR RATE, ECG03: 105 BPM
WBC # BLD AUTO: 22.4 K/UL (ref 4.6–13.2)

## 2018-11-21 PROCEDURE — 74011000258 HC RX REV CODE- 258: Performed by: EMERGENCY MEDICINE

## 2018-11-21 PROCEDURE — 74011250636 HC RX REV CODE- 250/636: Performed by: STUDENT IN AN ORGANIZED HEALTH CARE EDUCATION/TRAINING PROGRAM

## 2018-11-21 PROCEDURE — 84100 ASSAY OF PHOSPHORUS: CPT

## 2018-11-21 PROCEDURE — 74011250636 HC RX REV CODE- 250/636: Performed by: INTERNAL MEDICINE

## 2018-11-21 PROCEDURE — 77030027138 HC INCENT SPIROMETER -A

## 2018-11-21 PROCEDURE — 99223 1ST HOSP IP/OBS HIGH 75: CPT | Performed by: NURSE PRACTITIONER

## 2018-11-21 PROCEDURE — 87040 BLOOD CULTURE FOR BACTERIA: CPT

## 2018-11-21 PROCEDURE — 85025 COMPLETE CBC W/AUTO DIFF WBC: CPT

## 2018-11-21 PROCEDURE — 77030037870 HC GLD SHT PREVALON SAGE -B

## 2018-11-21 PROCEDURE — 74011250637 HC RX REV CODE- 250/637: Performed by: EMERGENCY MEDICINE

## 2018-11-21 PROCEDURE — 82962 GLUCOSE BLOOD TEST: CPT

## 2018-11-21 PROCEDURE — 65660000004 HC RM CVT STEPDOWN

## 2018-11-21 PROCEDURE — 77030037878 HC DRSG MEPILEX >48IN BORD MOLN -B

## 2018-11-21 PROCEDURE — 83735 ASSAY OF MAGNESIUM: CPT

## 2018-11-21 PROCEDURE — 76450000000

## 2018-11-21 PROCEDURE — 36415 COLL VENOUS BLD VENIPUNCTURE: CPT

## 2018-11-21 PROCEDURE — 83605 ASSAY OF LACTIC ACID: CPT

## 2018-11-21 PROCEDURE — 92526 ORAL FUNCTION THERAPY: CPT

## 2018-11-21 PROCEDURE — 80048 BASIC METABOLIC PNL TOTAL CA: CPT

## 2018-11-21 PROCEDURE — 74011000258 HC RX REV CODE- 258: Performed by: INTERNAL MEDICINE

## 2018-11-21 PROCEDURE — 74011250637 HC RX REV CODE- 250/637: Performed by: INTERNAL MEDICINE

## 2018-11-21 PROCEDURE — 80202 ASSAY OF VANCOMYCIN: CPT

## 2018-11-21 PROCEDURE — 74011250636 HC RX REV CODE- 250/636: Performed by: EMERGENCY MEDICINE

## 2018-11-21 RX ORDER — VANCOMYCIN/0.9 % SOD CHLORIDE 1.5G/250ML
1500 PLASTIC BAG, INJECTION (ML) INTRAVENOUS ONCE
Status: DISCONTINUED | OUTPATIENT
Start: 2018-11-21 | End: 2018-11-21

## 2018-11-21 RX ORDER — DEXTROMETHORPHAN POLISTIREX 30 MG/5 ML
SUSPENSION, EXTENDED RELEASE 12 HR ORAL
Status: COMPLETED | OUTPATIENT
Start: 2018-11-21 | End: 2018-11-21

## 2018-11-21 RX ORDER — SODIUM CHLORIDE 450 MG/100ML
75 INJECTION, SOLUTION INTRAVENOUS CONTINUOUS
Status: DISCONTINUED | OUTPATIENT
Start: 2018-11-21 | End: 2018-11-24

## 2018-11-21 RX ADMIN — SODIUM CHLORIDE 75 ML/HR: 450 INJECTION, SOLUTION INTRAVENOUS at 12:09

## 2018-11-21 RX ADMIN — SODIUM CHLORIDE 100 ML/HR: 900 INJECTION, SOLUTION INTRAVENOUS at 04:16

## 2018-11-21 RX ADMIN — LEVOTHYROXINE SODIUM 25 MCG: 25 TABLET ORAL at 05:24

## 2018-11-21 RX ADMIN — HEPARIN SODIUM 5000 UNITS: 5000 INJECTION, SOLUTION INTRAVENOUS; SUBCUTANEOUS at 04:17

## 2018-11-21 RX ADMIN — FINASTERIDE 5 MG: 5 TABLET, FILM COATED ORAL at 09:42

## 2018-11-21 RX ADMIN — PIPERACILLIN SODIUM AND TAZOBACTAM SODIUM 3.38 G: 3; .375 INJECTION, POWDER, LYOPHILIZED, FOR SOLUTION INTRAVENOUS at 21:58

## 2018-11-21 RX ADMIN — HEPARIN SODIUM 5000 UNITS: 5000 INJECTION, SOLUTION INTRAVENOUS; SUBCUTANEOUS at 17:15

## 2018-11-21 RX ADMIN — TAMSULOSIN HYDROCHLORIDE 0.4 MG: 0.4 CAPSULE ORAL at 21:58

## 2018-11-21 RX ADMIN — SENNOSIDES 8.6 MG: 8.6 TABLET, FILM COATED ORAL at 09:28

## 2018-11-21 RX ADMIN — PIPERACILLIN SODIUM AND TAZOBACTAM SODIUM 3.38 G: 3; .375 INJECTION, POWDER, LYOPHILIZED, FOR SOLUTION INTRAVENOUS at 09:28

## 2018-11-21 RX ADMIN — HEPARIN SODIUM 5000 UNITS: 5000 INJECTION, SOLUTION INTRAVENOUS; SUBCUTANEOUS at 09:28

## 2018-11-21 RX ADMIN — VANCOMYCIN HYDROCHLORIDE 1500 MG: 10 INJECTION, POWDER, LYOPHILIZED, FOR SOLUTION INTRAVENOUS at 11:00

## 2018-11-21 RX ADMIN — PIPERACILLIN SODIUM AND TAZOBACTAM SODIUM 3.38 G: 3; .375 INJECTION, POWDER, LYOPHILIZED, FOR SOLUTION INTRAVENOUS at 04:14

## 2018-11-21 RX ADMIN — MINERAL OIL 133 ML: 100 ENEMA RECTAL at 17:29

## 2018-11-21 RX ADMIN — LEVOFLOXACIN 750 MG: 5 INJECTION, SOLUTION INTRAVENOUS at 17:16

## 2018-11-21 RX ADMIN — PIPERACILLIN SODIUM AND TAZOBACTAM SODIUM 3.38 G: 3; .375 INJECTION, POWDER, LYOPHILIZED, FOR SOLUTION INTRAVENOUS at 17:17

## 2018-11-21 NOTE — PROGRESS NOTES
Problem: Dysphagia (Adult) Goal: *Acute Goals and Plan of Care (Insert Text) Patient will: 1. Tolerate PO trials with 0 s/s overt distress in 4/5 trials 2. Utilize compensatory swallow strategies/maneuvers (decrease bite/sip, size/rate, alt. liq/sol) with min cues in 4/5 trials Recommend:  
Soft solid diet with thin liquids Meds as tolerated Aspiration precautions HOB >45 degrees during all intake and for at least 30 min after po Small bites/sips, Slow rate of intake Outcome: Progressing Towards Goal 
Speech language pathology dysphagia treatment Patient: Ministerio Johnson (25 y.o. male) Date: 11/21/2018 Diagnosis: Septic shock ([facility] Aspiration pneumonia ([facility] Sepsis ([facility] Precautions: Aspiration ASSESSMENT: 
Pt seen for follow up dysphagia tx with family present at b/s. Pt alert and more responsive this am.  Per family, pt with limited intake this am.  Pt observed with med pass with RN present, tolerating consecutive swallows of thin liquids to clear pill with no overt s/sx aspiration. Pt tolerating solid presentations with mildly increased oral prep phase noted; however, no oral residue noted post swallow. Recommend continue current diet. If silent aspiration a concern, recommend MBS at MD discretion. Will follow as indicated for further dysphagia management. D/w pt, family and RN. Progression toward goals: 
[]         Improving appropriately and progressing toward goals [x]         Improving slowly and progressing toward goals 
[]         Not making progress toward goals and plan of care will be adjusted PLAN: 
Recommendations and Planned Interventions: 
Patient continues to benefit from skilled intervention to address the above impairments. Continue treatment per established plan of care. Discharge Recommendations:  Herminio Jacobs SUBJECTIVE:  
Patient stated Go it. OBJECTIVE:  
Cognitive and Communication Status: Neurologic State: (P) Alert, Eyes open spontaneously Orientation Level: Unable to verbalize Cognition: (P) Follows commands Dysphagia Treatment: 
Oral Assessment: 
Oral Assessment Labial: No impairment Dentition: Upper dentures(Missing bottom dentures ) Lingual: Decreased strength Velum: Unable to visualize Mandible: No impairment P.O. Trials: 
 Patient Position: 45 at [facility] Vocal quality prior to P.O.: Low volume Consistency Presented: Solid, Thin liquid, Pill How Presented: SLP-fed/presented, Straw, Successive swallows Bolus Acceptance: No impairment Bolus Formation/Control: Impaired Type of Impairment: Mastication, Delayed Propulsion: Delayed (# of seconds) Oral residue:  None Initiation of Swallow: No impairment Laryngeal Elevation: Functional 
 Aspiration Signs/Symptoms: None Pharyngeal Phase Characteristics: Poor endurance Effective Modifications: Small sips and bites, Alternate liquids/solids Cues for Modifications: Minimal 
 Oral Phase Severity: Mild Pharyngeal Phase Severity : Mild PAIN:No pain reported prior to or following tx After treatment:  
[]              Patient left in no apparent distress sitting up in chair 
[x]              Patient left in no apparent distress in bed 
[x]              Call bell left within reach [x]              Nursing notified 
[x]              Caregiver present 
[]              Bed alarm activated COMMUNICATION/EDUCATION:  
[x]              SLP educated pt/family with regard to compensatory swallow strategies and 
      aspiration/reflux precautions including: small bites/sips, 
      alternate liquids/solids, decrease feeding rate, HOB > 45 with all po, and 
                 upright in bed at 30 degrees after po for at least 45 
      minutes. Declan Layne M.S., CCC-SLP Speech-Language Pathologist

## 2018-11-21 NOTE — CONSULTS
[facility] [place]note 2]: [id number])  [facility]: [phone]   [facility]: [phone]    Patient Name: Marian Morales  YOB: 1930    Date of Initial Consult: 11/21/2018  Reason for Consult: goals of care  Requesting Provider: Marisol John MD   Primary Care Physician: Other, MD Shan      SUMMARY:   Marian Morales is a 80y.o. year old with a past history of T2DM, BPH, hearing impaired, dementia who was admitted on 11/19/2018 from [address] with a diagnosis of sepsis. Patient had increased lethargy and decreased responsiveness over the past day per sister. He was brought to the ED and noted to be febrile with elevated WBC, UA with 3+ bacteria, neg nitrites and leukocyte esterace. CT abdomen/pelvis with large fecal impaction compressing the prostate and bladder against the pubis. Bronchial thickening and bilateral patchy parenchymal opacities consistent with bronchopneumonia or aspiration. He was started on IV levaquin and zosyn and admitted to the hospital. Current medical issues leading to Palliative Medicine involvement include: advanced age, dementia, goals of care. PALLIATIVE DIAGNOSES:   1. Advanced care planning  2. Sepsis  3. Constipation  4. Debility       PLAN:   1. Advanced care planning: Palliative care team including Kimberly Viveros RN, Rutherford Boxer, LCSW and myself met with patient's sister, Hayden Stanford, in patient's room. Patient was sitting up in bed being fed by Hayden Stanford. He is non=verbal. Per Hayden Stanford, patient is unmarried and has no children. He is one of 10 siblings, 7 surviving and located throughout the country. She states she has power of  for healthcare and [facility] has a copy. We have asked her to have a copy sent to our institution for inclusion in chart. Per Hayden Stanford, patient has been declining since admission to [facility]. Over the past year, he has become wheelchair bound. He was able to feed himself although recently his appetite decreased. He was still participating in PT twice weekly. Instructed Carie on seriousness of patient's illness and probability of decreased ability to recover to former self. We also discussed likelihood of future discussions and changes in goals of care. Goals of care currently: DNR/DNI. DDNR signed and placed on chart. 2. Sepsis:  On admission, febrile with elevated WBC, UA with 3+ bacteria, neg nitrites and leukocyte esterace. CT abdomen/pelvis with large fecal impaction compressing the prostate and bladder against the pubis. Bronchial thickening and bilateral patchy parenchymal opacities consistent with bronchopneumonia or aspiration. Lactic acid was 6.3 and has now recovered to normal levels. He was started on IV levaquin and zosyn and admitted to the hospital. Primary team managing. 3. Constipation: CT abdomen/pelvis with large fecal impaction compressing the prostate and bladder against the pubis. He has not had any nausea/vomiting. Patient has been given BAILEE enema administered per nursing on 11/20/2018. Small BM after enema. Will likely require aggressive bowel regimen and/or GI consult. Primary team managing. 4. Debility: Patient demonstrating decreased ability for ambulation over the past year per family. PT/OT consults. Plan for discharge back to [address] [place] [address] when medically clear. 5. Initial consult note routed to primary continuity provider  6.  Communicated plan of care with: Palliative IDT    GOALS OF CARE:  Patient/Health Care Proxy Stated Goals: Rehabilitation      TREATMENT PREFERENCES:   Code Status: DNR/DNI  Advance Care Planning:  Advance Care Planning 11/21/2018   Patient's Healthcare Decision Maker is: Named in scanned ACP document   Primary Decision Maker Name -   Primary Decision Maker Phone Number -   Confirm Advance Directive Yes, not on file   Patient Would Like to Complete Advance Directive -   Does the patient have other document types Do Not Resuscitate     HEALTHCARE POWER OF  REQUESTED TO BE FAXED FROM [facility] BY PATIENT'S SISTERDAVE. Primary Decision Maker (Health Care Agent): Holly Pablo  Relationship to patient: Sister  Phone number: [phone]  [x] Named in a scanned document   [x] Legal Next of Kin  [] Guardian    Secondary Decision Maker ([address]):   Relationship to patient:  Phone number:  [] Named in a scanned document   [] Legal Next of Kin  [] Guardian    ACP documents you current have include:  [] Advance Directive or Living Will  [x] Durable Do Not Resuscitate  [] Physician Orders for Scope of Treatment (POST)  [] Medical Power of   [] Other        Other: As far as possible, the palliative care team has discussed with patient / health care proxy about goals of care / treatment preferences for patient. HISTORY:     History obtained from: chart and sisterMandi: goals of care    HPI/SUBJECTIVE:    The patient is:   [] Verbal and participatory  [x] Non-participatory due to: dementia and deafness    Clinical Pain Assessment (nonverbal scale for nonverbal patients): Clinical Pain Assessment  Severity: 0     Activity (Movement): Lying quietly, normal position    Duration: for how long has pt been experiencing pain (e.g., 2 days, 1 month, years)  Frequency: how often pain is an issue (e.g., several times per day, once every few days, constant)     FUNCTIONAL ASSESSMENT:     Palliative Performance Scale (PPS):  PPS: 30            PSYCHOSOCIAL/SPIRITUAL SCREENING:      Any spiritual / Mosque concerns:  [] Yes /  [] No    Caregiver Burnout:  [] Yes /  [x] No /  [] No Caregiver Present      Anticipatory grief assessment:   [x] Normal  / [] Maladaptive        REVIEW OF SYSTEMS:     Positive and pertinent negative findings in ROS are noted above in HPI.   The following systems were [] reviewed / [x] unable to be reviewed as noted in HPI  Other findings are noted below. Systems: constitutional, ears/nose/mouth/throat, respiratory, gastrointestinal, genitourinary, musculoskeletal, integumentary, neurologic, psychiatric, endocrine. Positive findings noted below. Modified ESAS Completed by: provider     Drowsiness: 3     Pain: 0   Anxiety: 0       Dyspnea: 0     Constipation: Yes     Stool Occurrence(s): 0        PHYSICAL EXAM:     Wt Readings from Last 3 Encounters:   11/21/18 76.3 kg (168 lb 4.8 oz)   06/20/17 74.8 kg (165 lb)   06/11/17 74.8 kg (165 lb)     Blood pressure (!) 168/91, pulse 85, temperature 99.6 °F (37.6 °C), resp. rate 20, height 5' 3\" (1.6 m), weight 76.3 kg (168 lb 4.8 oz), SpO2 97 %. Pain:  Pain Scale 1: Numeric (0 - 10)  Pain Intensity 1: 0     Pain Location 1: Foot           Last bowel movement: 11/21/2018    Constitutional: Chronically ill appearing elderly AA male sitting up in bed in no apparent distress. Eyes: pupils equal, anicteric Left scleral erythema. ENMT: no nasal discharge, moist mucous membranes  Respiratory: breathing unlabored, symmetric; on nasal cannula. Gastrointestinal: large and rounded. Skin: warm, dry  Neurologic: alert. Unable to assess orientation         HISTORY:     Active Problems:    Aspiration pneumonia ([address] (11/19/2018)      Septic shock ([address] (11/19/2018)      Sepsis ([facility] (11/19/2018)      Past Medical History:   Diagnosis Date    Deaf     bilateral    Deaf     Diabetes ([address]     Hyperglycemia     Hypertension     Hypertrophy of prostate with urinary obstruction and other lower urinary tract symptoms (LUTS)     Retention of urine     Retention, urine     Scarlet fever 1946    left pt deaf    UTI (lower urinary tract infection)       Past Surgical History:   Procedure Laterality Date    HX HERNIA REPAIR  1945      Family History   Problem Relation Age of Onset    Hypertension Mother      History reviewed, no pertinent family history.   Social History     Tobacco Use    Smoking status: Never Smoker    Smokeless tobacco: Never Used   Substance Use Topics    Alcohol use: No     Alcohol/week: 0.0 oz     No Known Allergies   Current Facility-Administered Medications   Medication Dose Route Frequency    0.45% sodium chloride infusion  75 mL/hr IntraVENous CONTINUOUS    finasteride (PROSCAR) tablet 5 mg  5 mg Oral DAILY    tamsulosin (FLOMAX) capsule 0.4 mg  0.4 mg Oral QHS    heparin (porcine) injection 5,000 Units  5,000 Units SubCUTAneous Q8H    insulin lispro (HUMALOG) injection   SubCUTAneous AC&HS    glucose chewable tablet 16 g  4 Tab Oral PRN    glucagon (GLUCAGEN) injection 1 mg  1 mg IntraMUSCular PRN    dextrose (D50W) injection syrg 12.5-25 g  25-50 mL IntraVENous PRN    bisacodyl (DULCOLAX) suppository 10 mg  10 mg Rectal DAILY PRN    senna (SENOKOT) tablet 8.6 mg  1 Tab Oral DAILY    piperacillin-tazobactam (ZOSYN) 3.375 g in 0.9% sodium chloride (MBP/ADV) 100 mL MBP  3.375 g IntraVENous Q6H    levothyroxine (SYNTHROID) tablet 25 mcg  25 mcg Oral 6am    sodium chloride (NS) flush 5-10 mL  5-10 mL IntraVENous PRN    levoFLOXacin (LEVAQUIN) 750 mg in D5W IVPB  750 mg IntraVENous Q48H        LAB AND IMAGING FINDINGS:     Lab Results   Component Value Date/Time    WBC 22.4 (H) 11/21/2018 05:36 AM    HGB 11.5 (L) 11/21/2018 05:36 AM    PLATELET 071 76/58/9966 05:36 AM     Lab Results   Component Value Date/Time    Sodium 147 (H) 11/21/2018 05:36 AM    Potassium 3.6 11/21/2018 05:36 AM    Chloride 114 (H) 11/21/2018 05:36 AM    CO2 23 11/21/2018 05:36 AM    BUN 18 11/21/2018 05:36 AM    Creatinine 1.20 11/21/2018 05:36 AM    Calcium 8.3 (L) 11/21/2018 05:36 AM    Magnesium 2.0 11/21/2018 05:36 AM    Phosphorus 2.2 (L) 11/21/2018 05:36 AM      Lab Results   Component Value Date/Time    AST (SGOT) 23 11/20/2018 06:00 AM    Alk.  phosphatase 55 11/20/2018 06:00 AM    Protein, total 5.7 (L) 11/20/2018 06:00 AM    Albumin 2.5 (L) 11/20/2018 06:00 AM    Globulin 3.2 11/20/2018 06:00 AM     No results found for: INR, PTMR, PTP, PT1, PT2, APTT   No results found for: IRON, FE, TIBC, IBCT, PSAT, FERR   No results found for: PH, PCO2, PO2  No components found for: GLPOC   No results found for: CPK, CKMB           Total time: 70 minutes  Counseling / coordination time, spent as noted above: 50 minutes > 50% counseling / coordination: patient's sister. Prolonged service was provided for  []30 min   []75 min in face to face time in the presence of the patient, spent as noted above. Time Start:   Time End:   Note: this can only be billed with 44757 (initial) or 84897 (follow up). If multiple start / stop times, list each separately.

## 2018-11-21 NOTE — PROGRESS NOTES
[facility] Patient: Jemal Crowell Age: 80 y.o. : 1930 MR#: [id number] SSN: [id number] Date/Time: 2018 Subjective:  
 
Patient lying in bed in NAD, awake, very [facility] Assessment/Plan: 1- Severe Sepsis ( POA ) likely 2/2 aspiration PNA or UTI 2- Aspiration PNA 3- JIMBO at admission 4- Fecal Impaction 5- DM2 
6- BPH 
7- Hard of hearing 8- GNR Bacteremia 9- Ecoli uti PLAN On zosyn and levaquin, repeat blood cx, d/w ID Diet as per speech D/w GI Dr Dashawn Lovelace, mineral oil enema Monitor sugars D/w RN 
DNR 
D/w wife over the phone Case discussed with:  []Patient  []Family  []Nursing  []Case Management DVT Prophylaxis:  []Lovenox  []Hep SQ  []SCDs  []Coumadin   []On Heparin gtt Objective:  
VS:  
Visit Vitals BP (!) 168/91 Pulse 85 Temp 99.6 °F (37.6 °C) Resp 20 Ht 5' 3\" (1.6 m) Wt 76.3 kg (168 lb 4.8 oz) SpO2 97% BMI 29.81 kg/m² Tmax/24hrs: Temp (24hrs), Av °F (37.2 °C), Min:97.6 °F (36.4 °C), Max:99.9 °F (37.7 °C) Input/Output:  
 
Intake/Output Summary (Last 24 hours) at 2018 1435 Last data filed at 2018 1312 Gross per 24 hour Intake 2038. 33 ml Output 2650 ml Net -611.67 ml General:  Awake, follows some commands Cardiovascular:  S1S2+, RRR Pulmonary:  Coarse bs b/l, some ronchi GI:  Soft, BS+, NT, ND Extremities:  trace edema Labs:   
Recent Results (from the past 24 hour(s)) LACTIC ACID Collection Time: 18  4:30 PM  
Result Value Ref Range Lactic acid 1.1 0.4 - 2.0 MMOL/L  
GLUCOSE, POC Collection Time: 18  4:57 PM  
Result Value Ref Range Glucose (POC) 135 (H) 70 - 110 mg/dL GLUCOSE, POC Collection Time: 18 10:03 PM  
Result Value Ref Range Glucose (POC) 95 70 - 110 mg/dL LACTIC ACID Collection Time: 18 10:06 PM  
Result Value Ref Range  Lactic acid 1.4 0.4 - 2.0 MMOL/L  
CBC WITH AUTOMATED DIFF  
 Collection Time: 11/21/18  5:36 AM  
Result Value Ref Range WBC 22.4 (H) 4.6 - 13.2 K/uL  
 RBC 4.06 (L) 4.70 - 5.50 M/uL  
 HGB 11.5 (L) 13.0 - 16.0 g/dL HCT 36.0 36.0 - 48.0 % MCV 88.7 74.0 - 97.0 FL  
 MCH 28.3 24.0 - 34.0 PG  
 MCHC 31.9 31.0 - 37.0 g/dL  
 RDW 14.7 (H) 11.6 - 14.5 % PLATELET 285 858 - 704 K/uL MPV 10.9 9.2 - 11.8 FL  
 NEUTROPHILS 82 (H) 42 - 75 % BAND NEUTROPHILS 3 0 - 5 % LYMPHOCYTES 6 (L) 20 - 51 % MONOCYTES 6 2 - 9 % EOSINOPHILS 3 0 - 5 % BASOPHILS 0 0 - 3 %  
 ABS. NEUTROPHILS 19.1 (H) 1.8 - 8.0 K/UL  
 ABS. LYMPHOCYTES 1.3 0.8 - 3.5 K/UL  
 ABS. MONOCYTES 1.3 (H) 0 - 1.0 K/UL  
 ABS. EOSINOPHILS 0.7 (H) 0.0 - 0.4 K/UL  
 ABS. BASOPHILS 0.0 0.0 - 0.06 K/UL  
 DF MANUAL PLATELET COMMENTS ADEQUATE PLATELETS    
 RBC COMMENTS NORMOCYTIC, NORMOCHROMIC METABOLIC PANEL, BASIC Collection Time: 11/21/18  5:36 AM  
Result Value Ref Range Sodium 147 (H) 136 - 145 mmol/L Potassium 3.6 3.5 - 5.5 mmol/L Chloride 114 (H) 100 - 108 mmol/L  
 CO2 23 21 - 32 mmol/L Anion gap 10 3.0 - 18 mmol/L Glucose 77 74 - 99 mg/dL BUN 18 7.0 - 18 MG/DL Creatinine 1.20 0.6 - 1.3 MG/DL  
 BUN/Creatinine ratio 15 12 - 20 GFR est AA >60 >60 ml/min/1.73m2 GFR est non-AA 57 (L) >60 ml/min/1.73m2 Calcium 8.3 (L) 8.5 - 10.1 MG/DL MAGNESIUM Collection Time: 11/21/18  5:36 AM  
Result Value Ref Range Magnesium 2.0 1.6 - 2.6 mg/dL PHOSPHORUS Collection Time: 11/21/18  5:36 AM  
Result Value Ref Range Phosphorus 2.2 (L) 2.5 - 4.9 MG/DL  
LACTIC ACID Collection Time: 11/21/18  5:36 AM  
Result Value Ref Range Lactic acid 1.2 0.4 - 2.0 MMOL/L  
VANCOMYCIN, RANDOM Collection Time: 11/21/18  5:36 AM  
Result Value Ref Range Vancomycin, random 10.8 5.0 - 40.0 UG/ML  
GLUCOSE, POC Collection Time: 11/21/18  7:56 AM  
Result Value Ref Range Glucose (POC) 95 70 - 110 mg/dL LACTIC ACID  Collection Time: 11/21/18  9:17 AM  
 Result Value Ref Range Lactic acid 1.2 0.4 - 2.0 MMOL/L  
GLUCOSE, POC Collection Time: 11/21/18 12:06 PM  
Result Value Ref Range Glucose (POC) 101 70 - 110 mg/dL Additional Data Reviewed:   
 
Signed By: Vikas Castanon MD   
 November 21, 2018

## 2018-11-21 NOTE — PROGRESS NOTES
Pt has been accepted back to return to [address] [place] [address] when medically cleared. Will need medical transport and CM will [address] [facility] [place], [phone]

## 2018-11-21 NOTE — CONSULTS
[facility]   [email or web address]      Impression:   1.aspiration pneumonia  Fecal impactin        Plan:   Instructions given to nursing  1. Chief Complaint: impaction      HPI:  Yaa Stinson is a 80 y.o. male who is being seen on consult for seen for the above. No bleeding, found to have soft impaction. Ivy Aggarwal     PMH:   Past Medical History:   Diagnosis Date    Deaf     bilateral    Deaf     Diabetes ([facility] [address]     Hyperglycemia     Hypertension     Hypertrophy of prostate with urinary obstruction and other lower urinary tract symptoms (LUTS)     Retention of urine     Retention, urine     Scarlet fever 1946    left pt deaf    UTI (lower urinary tract infection)        PSH:   Past Surgical History:   Procedure Laterality Date    HX HERNIA REPAIR  1945       Social HX:   Social History     Socioeconomic History    Marital status: SINGLE     Spouse name: Not on file    Number of children: Not on file    Years of education: Not on file    Highest education level: Not on file   Social Needs    Financial resource strain: Not on file    Food insecurity - worry: Not on file    Food insecurity - inability: Not on file   InSpa needs - medical: Not on file   InSpa needs - non-medical: Not on file   Occupational History    Not on file   Tobacco Use    Smoking status: Never Smoker    Smokeless tobacco: Never Used   Substance and Sexual Activity    Alcohol use: No     Alcohol/week: 0.0 oz    Drug use: No    Sexual activity: Not on file   Other Topics Concern    Not on file   Social History Narrative    Not on file       FHX:   Family History   Problem Relation Age of Onset    Hypertension Mother        Allergy:   No Known Allergies    Home Medications:     Medications Prior to Admission   Medication Sig    predniSONE (DELTASONE) 10 mg tablet 60 mg PO daily, decrease 5 mg every 3 days until complete    erythromycin (ILOTYCIN) ophthalmic ointment Thin film both eyes Q8 hrs x 5 days    bisacodyl (DULCOLAX, BISACODYL,) 10 mg suppository Insert 10 mg into rectum daily as needed.  sodium phosphate (ENEMA) 19-7 gram/118 mL enema Insert 1 Enema into rectum as needed.  glucagon (GLUCAGEN) 1 mg injection 1 mg by IntraVENous route as needed for Hypoglycemia.  bisacodyl (DULCOLAX, BISACODYL,) 5 mg EC tablet Take 10 mg by mouth daily as needed for Constipation. Indications: Constipation, do not crush    SITagliptin (JANUVIA) 25 mg tablet Take 25 mg by mouth daily. Do not crush    insulin glargine (LANTUS) 100 unit/mL injection 18 Units by SubCUTAneous route nightly.  furosemide (LASIX) 20 mg tablet Take 20 mg by mouth daily. Indications: Edema    atorvastatin (LIPITOR) 10 mg tablet Take 10 mg by mouth nightly. Indications: hypercholesterolemia    insulin aspart (NOVOLOG) 100 unit/mL injection 1-10 Units by SubCUTAneous route Before breakfast, lunch, dinner and at bedtime. Sliding scale  0-200=0units  201-250=2units  251-350=4units  351-400=6units  401-450=8units  451+=10units and call MD    levothyroxine (SYNTHROID) 25 mcg tablet Take 25 mcg by mouth Daily (before breakfast).  cholecalciferol, vitamin D3, 2,000 unit tab Take 1 Tab by mouth daily. Indications: VITAMIN D DEFICIENCY    amLODIPine (NORVASC) 10 mg tablet Take 10 mg by mouth daily.  acetaminophen (TYLENOL) 325 mg tablet Take 325 mg by mouth every six (6) hours as needed for Pain or Fever.  tamsulosin (FLOMAX) 0.4 mg capsule Take 0.4 mg by mouth nightly.  finasteride (PROSCAR) 5 mg tablet Take 5 mg by mouth daily.  lisinopril (PRINIVIL, ZESTRIL) 10 mg tablet Take 10 mg by mouth daily.        Review of Systems:  Non verbal     Constitutional:     Skin:    HENT:    Eyes:    Cardiovascular:    Respiratory:    Gastrointestinal:    Genitourinary:    Musculoskeletal:    Endo:    Heme:    Allergies:    Neurological:    Psychiatric:                   Visit Vitals  BP (!) 168/91   Pulse 85   Temp 99.6 °F (37.6 °C)   Resp 20   Ht 5' 3\" (1.6 m)   Wt 76.3 kg (168 lb 4.8 oz)   SpO2 97%   BMI 29.81 kg/m²       Physical Assessment:     constitutional: appearance: elderly  skin: inspection: no rashes, ulcers, icterus or other lesions; no clubbing or telangiectasias. palpation: no induration or subcutaneos nodules. eyes: inspection: normal conjunctivae and lids; no jaundice pupils: symmetrical, normoreactive to light, normal accommodation and size. ENMT: mouth: normal oral mucosa,lips and gums; good dentition. oropharynx: normal tongue, hard and soft palate; posterior pharynx without erithema, exudate or lesions. neck: thyroid: normal size, consistency and position; no masses or tenderness. respiratory: effort: normal chest excursion; no intercostal retraction or accessory muscle use. cardiovascular: abdominal aorta: normal size and position; no bruits. palpation: PMI of normal size and position; normal rhythm; no thrill or murmurs. abdominal: abdomen: normal consistency; no tenderness or masses. hernias: no hernias appreciated. liver: normal size and consistency. spleen: not palpable. rectal: hemoccult/guaiac: soft impaction, no blood on the glove musculoskeletal: digits and nails: no clubbing, cyanosis, petechiae or other inflammatory conditions. gait: normal gait and station head and neck: normal range of motion; no pain, crepitation or contracture. spine/ribs/pelvis: normal range of motion; no pain, deformity or contracture. lymphatic: axilae: not palpable. groin: not palpable. neck: within normal limits. other: not palpable.    neurologic: cranial nerves: not tested   psychiatric: judgement/insight: impaired     Basic Metabolic Profile   Recent Labs     11/21/18  0536   *   K 3.6   *   CO2 23   BUN 18   GLU 77   CA 8.3*   MG 2.0   PHOS 2.2*         CBC w/Diff    Recent Labs     11/21/18  0536   WBC 22.4*   RBC 4.06*   HGB 11.5*   HCT 36.0   MCV 88.7   MCH 28.3   MCHC 31.9   RDW 14.7*       Recent Labs     11/21/18  0536   GRANS 82*   LYMPH 6*   EOS 3        Hepatic Function   Recent Labs     11/20/18  0600   ALB 2.5*   TP 5.7*   TBILI 1.2*   SGOT 23   AP 55          Georgie Ortega MD, M.D. [facility], [address] [place] [address]  [email or web address]

## 2018-11-21 NOTE — ROUTINE PROCESS
2120 Bedside and Verbal shift change  Received from JAYE Clark RN (outgoing nurse), to REBEKAH Marquez (oncoming)  Pt. Is AOX self. IV patent and infusing well, Pt. No cues of  pain at this time. Report included the following information SBAR, Kardex, Procedure Summary, Intake/Output, MAR, Recent Lab Results, and  Cardiac Rhythm @ NSR. Will resume care and monitor Pt. Condition. Pt. Educated on call bell when in need of help and assistance. Pt. Monica Lola to comprehend education and verbalized understanding. 2150 Pt. Head to toe Assessment Done and documented. 2300  Pt. Resting in bed, no sign of distress. 0000  Pt. Not in distress. 0200  Pt. Starting to be restless, re-educated to stay in bed. 
 
0300  Pt. Given complete care, changed gown and linen. Pt. bm x1 loose dark brown. 0500  Pt. Continues to be restless, re positioned in bed. Bed alarm on. 
 
0630  Pt. Had on and off rest throughout the shift. Verbal and bedside Shift changed report given to Dereje Riley RN (oncoming RN) on Pt. Condition. Report consisted of patients Situation, History, Activities, intake/output,  Background, Assessment and Recommendations(SBAR). Information from the following report(s) Kardex, order Summary, Lab results and MAR was reviewed with the receiving nurse. Opportunity for questions and clarification was provided.

## 2018-11-21 NOTE — ACP (ADVANCE CARE PLANNING)
Palliative Medicine    Pt has completed an Advance Directive. Pt's sister will call [facility] and have a copy faxed to the Palliative Medicine team office. We will place a copy on the chart once it is received. Per pt's sister, she is the named primary MPOA:  Nithin Grimaldo (sister) ([phone]). Pt remains DNR. A new DDNR was completed and signed by the pt's MPOA (sister) Nithin Grimaldo and by Epifanio [facility] NP. A copy was placed on the chart to be scanned into EMR. An additional 3 copies were given to the pt's sister. Dispo plan is for pt to return to [facility] when medically stable. Thank you for the Palliative Medicine consult and allowing us to participate in the care of Mr. Chris Sage. Will continue to monitor and provide support.     Odette Esquivel RN, BSN  Palliative Medicine Inpatient RN  [facility]  Palliative COPE Line: [phone])

## 2018-11-21 NOTE — PROGRESS NOTES
Problem: Falls - Risk of 
Goal: *Absence of Falls Document Kermit Cabrera Fall Risk and appropriate interventions in the flowsheet. Outcome: Progressing Towards Goal 
Fall Risk Interventions: 
  
 
Mentation Interventions: Bed/chair exit alarm Medication Interventions: Bed/chair exit alarm Elimination Interventions: Call light in reach History of Falls Interventions: Bed/chair exit alarm, Door open when patient unattended Problem: Pressure Injury - Risk of 
Goal: *Prevention of pressure injury Document Moshe Scale and appropriate interventions in the flowsheet. Outcome: Progressing Towards Goal 
Pressure Injury Interventions: 
Sensory Interventions: Assess changes in LOC Moisture Interventions: Absorbent underpads Activity Interventions: Assess need for specialty bed Mobility Interventions: PT/OT evaluation Nutrition Interventions: Document food/fluid/supplement intake, Discuss nutritional consult with provider Friction and Shear Interventions: Apply protective barrier, creams and emollients

## 2018-11-22 LAB
ANION GAP SERPL CALC-SCNC: 8 MMOL/L (ref 3–18)
BASOPHILS # BLD: 0 K/UL (ref 0–0.1)
BASOPHILS NFR BLD: 0 % (ref 0–2)
BUN SERPL-MCNC: 15 MG/DL (ref 7–18)
BUN/CREAT SERPL: 14 (ref 12–20)
CALCIUM SERPL-MCNC: 7.7 MG/DL (ref 8.5–10.1)
CHLORIDE SERPL-SCNC: 111 MMOL/L (ref 100–108)
CO2 SERPL-SCNC: 25 MMOL/L (ref 21–32)
CREAT SERPL-MCNC: 1.08 MG/DL (ref 0.6–1.3)
DIFFERENTIAL METHOD BLD: ABNORMAL
EOSINOPHIL # BLD: 0.4 K/UL (ref 0–0.4)
EOSINOPHIL NFR BLD: 3 % (ref 0–5)
ERYTHROCYTE [DISTWIDTH] IN BLOOD BY AUTOMATED COUNT: 14.7 % (ref 11.6–14.5)
GLUCOSE BLD STRIP.AUTO-MCNC: 111 MG/DL (ref 70–110)
GLUCOSE BLD STRIP.AUTO-MCNC: 133 MG/DL (ref 70–110)
GLUCOSE BLD STRIP.AUTO-MCNC: 91 MG/DL (ref 70–110)
GLUCOSE SERPL-MCNC: 85 MG/DL (ref 74–99)
HCT VFR BLD AUTO: 32.3 % (ref 36–48)
HGB BLD-MCNC: 10.8 G/DL (ref 13–16)
LYMPHOCYTES # BLD: 1.1 K/UL (ref 0.9–3.6)
LYMPHOCYTES NFR BLD: 9 % (ref 21–52)
MAGNESIUM SERPL-MCNC: 1.9 MG/DL (ref 1.6–2.6)
MCH RBC QN AUTO: 29.3 PG (ref 24–34)
MCHC RBC AUTO-ENTMCNC: 33.4 G/DL (ref 31–37)
MCV RBC AUTO: 87.8 FL (ref 74–97)
MONOCYTES # BLD: 0.5 K/UL (ref 0.05–1.2)
MONOCYTES NFR BLD: 4 % (ref 3–10)
NEUTS SEG # BLD: 11.1 K/UL (ref 1.8–8)
NEUTS SEG NFR BLD: 84 % (ref 40–73)
PHOSPHATE SERPL-MCNC: 2 MG/DL (ref 2.5–4.9)
PLATELET # BLD AUTO: 149 K/UL (ref 135–420)
PMV BLD AUTO: 11 FL (ref 9.2–11.8)
POTASSIUM SERPL-SCNC: 3.3 MMOL/L (ref 3.5–5.5)
RBC # BLD AUTO: 3.68 M/UL (ref 4.7–5.5)
SODIUM SERPL-SCNC: 144 MMOL/L (ref 136–145)
WBC # BLD AUTO: 13.2 K/UL (ref 4.6–13.2)

## 2018-11-22 PROCEDURE — 74011250637 HC RX REV CODE- 250/637: Performed by: INTERNAL MEDICINE

## 2018-11-22 PROCEDURE — 82962 GLUCOSE BLOOD TEST: CPT

## 2018-11-22 PROCEDURE — 74011000258 HC RX REV CODE- 258: Performed by: INTERNAL MEDICINE

## 2018-11-22 PROCEDURE — 85025 COMPLETE CBC W/AUTO DIFF WBC: CPT

## 2018-11-22 PROCEDURE — 83735 ASSAY OF MAGNESIUM: CPT

## 2018-11-22 PROCEDURE — 80048 BASIC METABOLIC PNL TOTAL CA: CPT

## 2018-11-22 PROCEDURE — 74011250636 HC RX REV CODE- 250/636: Performed by: INTERNAL MEDICINE

## 2018-11-22 PROCEDURE — 36415 COLL VENOUS BLD VENIPUNCTURE: CPT

## 2018-11-22 PROCEDURE — 65660000000 HC RM CCU STEPDOWN

## 2018-11-22 PROCEDURE — 84100 ASSAY OF PHOSPHORUS: CPT

## 2018-11-22 PROCEDURE — 74011000258 HC RX REV CODE- 258: Performed by: EMERGENCY MEDICINE

## 2018-11-22 PROCEDURE — 77010033678 HC OXYGEN DAILY

## 2018-11-22 RX ADMIN — PIPERACILLIN SODIUM AND TAZOBACTAM SODIUM 3.38 G: 3; .375 INJECTION, POWDER, LYOPHILIZED, FOR SOLUTION INTRAVENOUS at 09:23

## 2018-11-22 RX ADMIN — TAMSULOSIN HYDROCHLORIDE 0.4 MG: 0.4 CAPSULE ORAL at 21:26

## 2018-11-22 RX ADMIN — FINASTERIDE 5 MG: 5 TABLET, FILM COATED ORAL at 09:00

## 2018-11-22 RX ADMIN — LEVOTHYROXINE SODIUM 25 MCG: 25 TABLET ORAL at 06:48

## 2018-11-22 RX ADMIN — HEPARIN SODIUM 5000 UNITS: 5000 INJECTION, SOLUTION INTRAVENOUS; SUBCUTANEOUS at 02:59

## 2018-11-22 RX ADMIN — PIPERACILLIN SODIUM AND TAZOBACTAM SODIUM 3.38 G: 3; .375 INJECTION, POWDER, LYOPHILIZED, FOR SOLUTION INTRAVENOUS at 03:01

## 2018-11-22 RX ADMIN — Medication 10 ML: at 06:49

## 2018-11-22 RX ADMIN — HEPARIN SODIUM 5000 UNITS: 5000 INJECTION, SOLUTION INTRAVENOUS; SUBCUTANEOUS at 09:25

## 2018-11-22 RX ADMIN — PIPERACILLIN SODIUM AND TAZOBACTAM SODIUM 3.38 G: 3; .375 INJECTION, POWDER, LYOPHILIZED, FOR SOLUTION INTRAVENOUS at 17:49

## 2018-11-22 RX ADMIN — SENNOSIDES 8.6 MG: 8.6 TABLET, FILM COATED ORAL at 09:25

## 2018-11-22 RX ADMIN — SODIUM CHLORIDE 75 ML/HR: 450 INJECTION, SOLUTION INTRAVENOUS at 22:14

## 2018-11-22 RX ADMIN — PIPERACILLIN SODIUM AND TAZOBACTAM SODIUM 3.38 G: 3; .375 INJECTION, POWDER, LYOPHILIZED, FOR SOLUTION INTRAVENOUS at 21:26

## 2018-11-22 RX ADMIN — HEPARIN SODIUM 5000 UNITS: 5000 INJECTION, SOLUTION INTRAVENOUS; SUBCUTANEOUS at 17:49

## 2018-11-22 NOTE — ROUTINE PROCESS
1925 Bedside and Verbal shift change  Received from Ping Chandler RN (outgoing nurse), to REBEKAH Hoffmann (oncoming)  Pt. Is AOX self. IV Patent and infusing well, Pt. No cues of  pain at this time. Report included the following information SBAR, Kardex, Procedure Summary, Intake/Output, MAR, Recent Lab Results, and  Cardiac Rhythm @ NSR. Will resume care and monitor Pt. Condition. Pt. Educated on call bell when in need of help and assistance. Pt. Unable to comprehend education. Pt. Head to toe Assessment Done and documented. 2100  Pt. Made no complaints. 2330  Pt. Had large BM X1. Given incontinent care, ender care and hudson care. 0100  Pt. Resting comfortably in bed no sign of distress. 0300  Pt. Eyes closed easily awaken. 0500  Pt. Able to rest well throughout the shift. 0630  Pt. Made no complaints. Verbal and bedside Shift changed report given to Ping Chandler RN (oncoming RN) on Pt. Condition. Report consisted of patients Situation, History, Activities, intake/output,  Background, Assessment and Recommendations(SBAR). Information from the following report(s) Kardex, order Summary, Lab results and MAR was reviewed with the receiving nurse. Opportunity for questions and clarification was provided.

## 2018-11-22 NOTE — PROGRESS NOTES
[facility] Hospitalist Group Progress Note Patient: Algis Cheadle Age: 80 y.o. : 1930 MR#: [id number] SSN: [id number] Date/Time: 2018 Subjective:  
 
Patient lying in bed in NAD, awake, has deafness Assessment/Plan: 1- Severe Sepsis ( POA ) likely 2/2 aspiration PNA or UTI 2- Aspiration PNA 3- JIMBO at admission 4- Fecal Impaction 5- DM2 
6- BPH 
7- Hard of hearing 8- E coli Bacteremia 9- Ecoli uti 10- Urinary retention PLAN On zosyn and levaquin, follow repeat blood cx, Diet as per speech Per RN patient has had 3 large BMs since yesterday- bowel regimen Monitor sugars Voiding trial, on flomax D/w RN 
PT, OT 
DNR 
D/w sister over phone [phone] Case discussed with:  []Patient  []Family  []Nursing  []Case Management DVT Prophylaxis:  []Lovenox  []Hep SQ  []SCDs  []Coumadin   []On Heparin gtt Objective:  
VS:  
Visit Vitals /71 Pulse 68 Temp 99.6 °F (37.6 °C) Resp 20 Ht 5' 3\" (1.6 m) Wt 76.3 kg (168 lb 4.8 oz) SpO2 98% BMI 29.81 kg/m² Tmax/24hrs: Temp (24hrs), Av °F (37.2 °C), Min:98.2 °F (36.8 °C), Max:99.8 °F (37.7 °C) Input/Output:  
 
Intake/Output Summary (Last 24 hours) at 2018 1625 Last data filed at 2018 3635 Gross per 24 hour Intake 1075 ml Output 2401 ml Net -1326 ml General:  Awake, in NAD Cardiovascular:  S1S2+, RRR Pulmonary:  Coarse bsb/l GI:  Soft, BS+, NT, ND Extremities:  trace edema Deaf Labs:   
Recent Results (from the past 24 hour(s)) LACTIC ACID Collection Time: 18  5:45 PM  
Result Value Ref Range Lactic acid 1.0 0.4 - 2.0 MMOL/L  
GLUCOSE, POC Collection Time: 18  9:24 PM  
Result Value Ref Range Glucose (POC) 129 (H) 70 - 110 mg/dL CBC WITH AUTOMATED DIFF Collection Time: 18  3:52 AM  
Result Value Ref Range WBC 13.2 4.6 - 13.2 K/uL  
 RBC 3.68 (L) 4.70 - 5.50 M/uL  
 HGB 10.8 (L) 13.0 - 16.0 g/dL HCT 32.3 (L) 36.0 - 48.0 % MCV 87.8 74.0 - 97.0 FL  
 MCH 29.3 24.0 - 34.0 PG  
 MCHC 33.4 31.0 - 37.0 g/dL  
 RDW 14.7 (H) 11.6 - 14.5 % PLATELET 490 730 - 835 K/uL MPV 11.0 9.2 - 11.8 FL  
 NEUTROPHILS 84 (H) 40 - 73 % LYMPHOCYTES 9 (L) 21 - 52 % MONOCYTES 4 3 - 10 % EOSINOPHILS 3 0 - 5 % BASOPHILS 0 0 - 2 %  
 ABS. NEUTROPHILS 11.1 (H) 1.8 - 8.0 K/UL  
 ABS. LYMPHOCYTES 1.1 0.9 - 3.6 K/UL  
 ABS. MONOCYTES 0.5 0.05 - 1.2 K/UL  
 ABS. EOSINOPHILS 0.4 0.0 - 0.4 K/UL  
 ABS. BASOPHILS 0.0 0.0 - 0.1 K/UL  
 DF AUTOMATED METABOLIC PANEL, BASIC Collection Time: 11/22/18  3:52 AM  
Result Value Ref Range Sodium 144 136 - 145 mmol/L Potassium 3.3 (L) 3.5 - 5.5 mmol/L Chloride 111 (H) 100 - 108 mmol/L  
 CO2 25 21 - 32 mmol/L Anion gap 8 3.0 - 18 mmol/L Glucose 85 74 - 99 mg/dL BUN 15 7.0 - 18 MG/DL Creatinine 1.08 0.6 - 1.3 MG/DL  
 BUN/Creatinine ratio 14 12 - 20 GFR est AA >60 >60 ml/min/1.73m2 GFR est non-AA >60 >60 ml/min/1.73m2 Calcium 7.7 (L) 8.5 - 10.1 MG/DL MAGNESIUM Collection Time: 11/22/18  3:52 AM  
Result Value Ref Range Magnesium 1.9 1.6 - 2.6 mg/dL PHOSPHORUS Collection Time: 11/22/18  3:52 AM  
Result Value Ref Range Phosphorus 2.0 (L) 2.5 - 4.9 MG/DL  
GLUCOSE, POC Collection Time: 11/22/18  7:53 AM  
Result Value Ref Range Glucose (POC) 91 70 - 110 mg/dL GLUCOSE, POC Collection Time: 11/22/18 11:34 AM  
Result Value Ref Range Glucose (POC) 133 (H) 70 - 110 mg/dL Additional Data Reviewed:   
 
Signed By: Rohan Hogan MD   
 November 22, 2018

## 2018-11-23 LAB
ANION GAP SERPL CALC-SCNC: 6 MMOL/L (ref 3–18)
BACTERIA SPEC CULT: ABNORMAL
BASOPHILS # BLD: 0 K/UL (ref 0–0.1)
BASOPHILS NFR BLD: 0 % (ref 0–2)
BUN SERPL-MCNC: 10 MG/DL (ref 7–18)
BUN/CREAT SERPL: 9 (ref 12–20)
CALCIUM SERPL-MCNC: 7.7 MG/DL (ref 8.5–10.1)
CHLORIDE SERPL-SCNC: 111 MMOL/L (ref 100–108)
CO2 SERPL-SCNC: 27 MMOL/L (ref 21–32)
CREAT SERPL-MCNC: 1.16 MG/DL (ref 0.6–1.3)
DIFFERENTIAL METHOD BLD: ABNORMAL
EOSINOPHIL # BLD: 0.4 K/UL (ref 0–0.4)
EOSINOPHIL NFR BLD: 7 % (ref 0–5)
ERYTHROCYTE [DISTWIDTH] IN BLOOD BY AUTOMATED COUNT: 14.4 % (ref 11.6–14.5)
GLUCOSE BLD STRIP.AUTO-MCNC: 113 MG/DL (ref 70–110)
GLUCOSE BLD STRIP.AUTO-MCNC: 140 MG/DL (ref 70–110)
GLUCOSE BLD STRIP.AUTO-MCNC: 142 MG/DL (ref 70–110)
GLUCOSE BLD STRIP.AUTO-MCNC: 147 MG/DL (ref 70–110)
GLUCOSE BLD STRIP.AUTO-MCNC: 92 MG/DL (ref 70–110)
GLUCOSE SERPL-MCNC: 131 MG/DL (ref 74–99)
GRAM STN SPEC: ABNORMAL
HCT VFR BLD AUTO: 33.6 % (ref 36–48)
HGB BLD-MCNC: 11.1 G/DL (ref 13–16)
LYMPHOCYTES # BLD: 0.9 K/UL (ref 0.9–3.6)
LYMPHOCYTES NFR BLD: 14 % (ref 21–52)
MAGNESIUM SERPL-MCNC: 1.7 MG/DL (ref 1.6–2.6)
MCH RBC QN AUTO: 28.7 PG (ref 24–34)
MCHC RBC AUTO-ENTMCNC: 33 G/DL (ref 31–37)
MCV RBC AUTO: 86.8 FL (ref 74–97)
MONOCYTES # BLD: 0.4 K/UL (ref 0.05–1.2)
MONOCYTES NFR BLD: 6 % (ref 3–10)
NEUTS SEG # BLD: 4.5 K/UL (ref 1.8–8)
NEUTS SEG NFR BLD: 73 % (ref 40–73)
PHOSPHATE SERPL-MCNC: 1.9 MG/DL (ref 2.5–4.9)
PLATELET # BLD AUTO: 158 K/UL (ref 135–420)
PMV BLD AUTO: 10.4 FL (ref 9.2–11.8)
POTASSIUM SERPL-SCNC: 3 MMOL/L (ref 3.5–5.5)
RBC # BLD AUTO: 3.87 M/UL (ref 4.7–5.5)
SERVICE CMNT-IMP: ABNORMAL
SERVICE CMNT-IMP: ABNORMAL
SODIUM SERPL-SCNC: 144 MMOL/L (ref 136–145)
WBC # BLD AUTO: 6.2 K/UL (ref 4.6–13.2)

## 2018-11-23 PROCEDURE — 84100 ASSAY OF PHOSPHORUS: CPT

## 2018-11-23 PROCEDURE — 77030010545

## 2018-11-23 PROCEDURE — 74011000250 HC RX REV CODE- 250: Performed by: HOSPITALIST

## 2018-11-23 PROCEDURE — 77030020186 HC BOOT HL PROTCT SAGE -B

## 2018-11-23 PROCEDURE — 85025 COMPLETE CBC W/AUTO DIFF WBC: CPT

## 2018-11-23 PROCEDURE — 74011250637 HC RX REV CODE- 250/637: Performed by: INTERNAL MEDICINE

## 2018-11-23 PROCEDURE — 74011000258 HC RX REV CODE- 258: Performed by: INTERNAL MEDICINE

## 2018-11-23 PROCEDURE — 80048 BASIC METABOLIC PNL TOTAL CA: CPT

## 2018-11-23 PROCEDURE — 65660000000 HC RM CCU STEPDOWN

## 2018-11-23 PROCEDURE — 74011250636 HC RX REV CODE- 250/636: Performed by: INTERNAL MEDICINE

## 2018-11-23 PROCEDURE — 36415 COLL VENOUS BLD VENIPUNCTURE: CPT

## 2018-11-23 PROCEDURE — 77030037875 HC DRSG MEPILEX <16IN BORD MOLN -A

## 2018-11-23 PROCEDURE — 83735 ASSAY OF MAGNESIUM: CPT

## 2018-11-23 PROCEDURE — 74011250636 HC RX REV CODE- 250/636: Performed by: HOSPITALIST

## 2018-11-23 PROCEDURE — 74011250637 HC RX REV CODE- 250/637: Performed by: HOSPITALIST

## 2018-11-23 PROCEDURE — 82962 GLUCOSE BLOOD TEST: CPT

## 2018-11-23 RX ORDER — POLYVINYL ALCOHOL 14 MG/ML
1 SOLUTION/ DROPS OPHTHALMIC EVERY 8 HOURS
Status: DISCONTINUED | OUTPATIENT
Start: 2018-11-23 | End: 2018-11-24 | Stop reason: HOSPADM

## 2018-11-23 RX ORDER — LEVOFLOXACIN 5 MG/ML
750 INJECTION, SOLUTION INTRAVENOUS
Status: COMPLETED | OUTPATIENT
Start: 2018-11-23 | End: 2018-11-23

## 2018-11-23 RX ORDER — LEVOFLOXACIN 750 MG/1
750 TABLET ORAL
Status: DISCONTINUED | OUTPATIENT
Start: 2018-11-25 | End: 2018-11-24 | Stop reason: HOSPADM

## 2018-11-23 RX ORDER — THERA TABS 400 MCG
1 TAB ORAL DAILY
Status: DISCONTINUED | OUTPATIENT
Start: 2018-11-24 | End: 2018-11-24 | Stop reason: HOSPADM

## 2018-11-23 RX ORDER — LANOLIN ALCOHOL/MO/W.PET/CERES
400 CREAM (GRAM) TOPICAL DAILY
Status: DISCONTINUED | OUTPATIENT
Start: 2018-11-23 | End: 2018-11-24 | Stop reason: HOSPADM

## 2018-11-23 RX ORDER — POTASSIUM CHLORIDE 1.5 G/1.77G
40 POWDER, FOR SOLUTION ORAL EVERY 6 HOURS
Status: COMPLETED | OUTPATIENT
Start: 2018-11-23 | End: 2018-11-23

## 2018-11-23 RX ADMIN — BENZALKONIUM CHLORIDE, SODIUM PHOSPHATE, DIBASIC, EDETATE DISODIUM,SODIUM PHOSPHATE, MONOBASIC, SODIUM CHLORIDE, WATER, PHOSPHORIC ACID, SODIUM HYDROXIDE 1 DROP: 14 SOLUTION INTRAOCULAR at 22:01

## 2018-11-23 RX ADMIN — POTASSIUM CHLORIDE 40 MEQ: 1.5 POWDER, FOR SOLUTION ORAL at 17:27

## 2018-11-23 RX ADMIN — TAMSULOSIN HYDROCHLORIDE 0.4 MG: 0.4 CAPSULE ORAL at 21:53

## 2018-11-23 RX ADMIN — FINASTERIDE 5 MG: 5 TABLET, FILM COATED ORAL at 09:03

## 2018-11-23 RX ADMIN — HEPARIN SODIUM 5000 UNITS: 5000 INJECTION, SOLUTION INTRAVENOUS; SUBCUTANEOUS at 09:03

## 2018-11-23 RX ADMIN — HEPARIN SODIUM 5000 UNITS: 5000 INJECTION, SOLUTION INTRAVENOUS; SUBCUTANEOUS at 17:27

## 2018-11-23 RX ADMIN — SENNOSIDES 8.6 MG: 8.6 TABLET, FILM COATED ORAL at 09:04

## 2018-11-23 RX ADMIN — Medication 400 MG: at 17:27

## 2018-11-23 RX ADMIN — LEVOFLOXACIN 750 MG: 5 INJECTION, SOLUTION INTRAVENOUS at 17:28

## 2018-11-23 RX ADMIN — LEVOTHYROXINE SODIUM 25 MCG: 25 TABLET ORAL at 05:13

## 2018-11-23 RX ADMIN — HEPARIN SODIUM 5000 UNITS: 5000 INJECTION, SOLUTION INTRAVENOUS; SUBCUTANEOUS at 01:13

## 2018-11-23 RX ADMIN — POTASSIUM CHLORIDE 40 MEQ: 1.5 POWDER, FOR SOLUTION ORAL at 21:53

## 2018-11-23 RX ADMIN — PIPERACILLIN SODIUM AND TAZOBACTAM SODIUM 3.38 G: 3; .375 INJECTION, POWDER, LYOPHILIZED, FOR SOLUTION INTRAVENOUS at 05:13

## 2018-11-23 RX ADMIN — BENZALKONIUM CHLORIDE, SODIUM PHOSPHATE, DIBASIC, EDETATE DISODIUM,SODIUM PHOSPHATE, MONOBASIC, SODIUM CHLORIDE, WATER, PHOSPHORIC ACID, SODIUM HYDROXIDE 1 DROP: 14 SOLUTION INTRAOCULAR at 17:57

## 2018-11-23 RX ADMIN — DIBASIC SODIUM PHOSPHATE, MONOBASIC POTASSIUM PHOSPHATE AND MONOBASIC SODIUM PHOSPHATE 1 TABLET: 852; 155; 130 TABLET ORAL at 17:27

## 2018-11-23 RX ADMIN — PIPERACILLIN SODIUM AND TAZOBACTAM SODIUM 3.38 G: 3; .375 INJECTION, POWDER, LYOPHILIZED, FOR SOLUTION INTRAVENOUS at 09:03

## 2018-11-23 NOTE — ROUTINE PROCESS
TRANSFER - OUT REPORT: 
 
Verbal report given to López Berry RN(name) on [facility]  being transferred to [address] [place] [address](unit) for routine progression of care Report consisted of patients Situation, Background, Assessment and  
Recommendations(SBAR). Information from the following report(s) SBAR, Intake/Output, MAR, Recent Results and Cardiac Rhythm SR was reviewed with the receiving nurse. Lines:  
Peripheral IV 11/19/18 Left Forearm (Active) Site Assessment Clean, dry, & intact 11/22/2018  8:08 PM  
Phlebitis Assessment 0 11/22/2018  8:08 PM  
Infiltration Assessment 0 11/22/2018  8:08 PM  
Dressing Status Clean, dry, & intact 11/22/2018  8:08 PM  
Dressing Type Transparent 11/22/2018  8:08 PM  
Hub Color/Line Status Infusing 11/22/2018  8:08 PM  
Action Taken Open ports on tubing capped 11/22/2018  8:08 PM  
Alcohol Cap Used Yes 11/22/2018  8:08 PM  
   
Peripheral IV 11/19/18 Right Wrist (Active) Site Assessment Clean, dry, & intact 11/21/2018  7:45 PM  
Phlebitis Assessment 0 11/21/2018  7:45 PM  
Infiltration Assessment 0 11/21/2018  7:45 PM  
Dressing Status Clean, dry, & intact 11/21/2018  7:45 PM  
Dressing Type Transparent;Tape 11/21/2018  7:45 PM  
Hub Color/Line Status Pink; Infusing 11/21/2018  7:45 PM  
Action Taken Other (comment) 11/21/2018  7:45 PM  
Alcohol Cap Used Yes 11/20/2018 12:21 PM  
  
 
Opportunity for questions and clarification was provided. Patient transported with: 
 O2 @ 3L liters Registered Nurse

## 2018-11-23 NOTE — PROGRESS NOTES
[facility] Patient: Yeyo Sy Age: 80 y.o. : 1930 MR#: [id number] SSN: [id number] Date/Time: 2018 Subjective:  
 
Just had a soft bowel movement. Patient is essentially non verbal. Documented voiding on flowsheet. Assessment/Plan:  
 
1 Severe Sepsis (fever, tachycardia, leukocytosis) POA with evidence of end organ damage (JIMBO, elevated bili) - sources include PNA, UTI, bacteremia. 2. WATSON resolved - postrenal obstruction in the setting of fecal impaction. 3. JIMBO at admission resolved. 4. Fecal Impaction resolved. 5. DM2 - ADA diet, ssi. 6. BPH on proscar, flomax. 7. Hard of hearing - supportive care. 8. E coli Bacteremia - source m/l urine - repeat blood cx () ngtd. Changed to levaquin based on sensitivity. 9. Ecoli uti - changed to levaquin based on sensitivity. 10. Hypernatremia better. Needs access to free water at all times. 11. Anemia normocytic normochromic 12. Hypophosphatemia / hypokalemia - replete as needed. 13. Mild protein calorie malnutrition AEB Inadequate oral intake related to altered mentation and decreased appetite as evidenced by patient consuming 50% or less of recent meals. On supplements, nutritionist following, multivit 14. Hypothyroidism on synthroid 15. mild oral and suspected mild pharyngeal dysphagia. SLP recs noted. MBS if still here Monday. 16. DNR / durable DNR signed this admission. Return to [facility] when ready. D/w sister over [facility] [phone] [facility], all questions answered to the best of my ability. Case discussed with:  [x]Patient  [x]Family  [x]Nursing  [x]Case Management DVT Prophylaxis:  []Lovenox  [x]Hep SQ  []SCDs  []Coumadin   []On Heparin gtt Objective:  
VS:  
Visit Vitals /51 (BP 1 Location: Left arm, BP Patient Position: At rest) Pulse 67 Temp 98.1 °F (36.7 °C) Resp 18 Ht 5' 3\" (1.6 m) Wt 72.4 kg (159 lb 9.8 oz) SpO2 97% BMI 28.27 kg/m² Tmax/24hrs: Temp (24hrs), Av.3 °F (37.4 °C), Min:98.1 °F (36.7 °C), Max:100.1 °F (37.8 °C) Input/Output:  
 
Intake/Output Summary (Last 24 hours) at 2018 1506 Last data filed at 2018 1005 Gross per 24 hour Intake 960 ml Output 1251 ml Net -291 ml General:  Awake, in NAD Cardiovascular:  S1S2+, RRR Pulmonary: cta b.l 
GI:  Soft, NT, ND nabs. Extremities:  trace edema Deaf Skin: no rash Labs:   
Recent Results (from the past 24 hour(s)) GLUCOSE, POC Collection Time: 18  4:04 PM  
Result Value Ref Range Glucose (POC) 111 (H) 70 - 110 mg/dL GLUCOSE, POC Collection Time: 18 12:47 AM  
Result Value Ref Range Glucose (POC) 142 (H) 70 - 110 mg/dL MAGNESIUM Collection Time: 18  1:50 AM  
Result Value Ref Range Magnesium 1.7 1.6 - 2.6 mg/dL PHOSPHORUS Collection Time: 18  1:50 AM  
Result Value Ref Range Phosphorus 1.9 (L) 2.5 - 4.9 MG/DL  
CBC WITH AUTOMATED DIFF Collection Time: 18  1:50 AM  
Result Value Ref Range WBC 6.2 4.6 - 13.2 K/uL  
 RBC 3.87 (L) 4.70 - 5.50 M/uL  
 HGB 11.1 (L) 13.0 - 16.0 g/dL HCT 33.6 (L) 36.0 - 48.0 % MCV 86.8 74.0 - 97.0 FL  
 MCH 28.7 24.0 - 34.0 PG  
 MCHC 33.0 31.0 - 37.0 g/dL  
 RDW 14.4 11.6 - 14.5 % PLATELET 417 609 - 233 K/uL MPV 10.4 9.2 - 11.8 FL  
 NEUTROPHILS 73 40 - 73 % LYMPHOCYTES 14 (L) 21 - 52 % MONOCYTES 6 3 - 10 % EOSINOPHILS 7 (H) 0 - 5 % BASOPHILS 0 0 - 2 %  
 ABS. NEUTROPHILS 4.5 1.8 - 8.0 K/UL  
 ABS. LYMPHOCYTES 0.9 0.9 - 3.6 K/UL  
 ABS. MONOCYTES 0.4 0.05 - 1.2 K/UL  
 ABS. EOSINOPHILS 0.4 0.0 - 0.4 K/UL  
 ABS. BASOPHILS 0.0 0.0 - 0.1 K/UL  
 DF AUTOMATED METABOLIC PANEL, BASIC Collection Time: 18  1:50 AM  
Result Value Ref Range Sodium 144 136 - 145 mmol/L Potassium 3.0 (L) 3.5 - 5.5 mmol/L Chloride 111 (H) 100 - 108 mmol/L  
 CO2 27 21 - 32 mmol/L  Anion gap 6 3.0 - 18 mmol/L  
 Glucose 131 (H) 74 - 99 mg/dL BUN 10 7.0 - 18 MG/DL Creatinine 1.16 0.6 - 1.3 MG/DL  
 BUN/Creatinine ratio 9 (L) 12 - 20 GFR est AA >60 >60 ml/min/1.73m2 GFR est non-AA 59 (L) >60 ml/min/1.73m2 Calcium 7.7 (L) 8.5 - 10.1 MG/DL  
GLUCOSE, POC Collection Time: 11/23/18  9:02 AM  
Result Value Ref Range Glucose (POC) 92 70 - 110 mg/dL GLUCOSE, POC Collection Time: 11/23/18 11:27 AM  
Result Value Ref Range Glucose (POC) 140 (H) 70 - 110 mg/dL Additional Data Reviewed:   
 
Signed By: Sunita Estevez MD   
 November 23, 2018

## 2018-11-23 NOTE — PROGRESS NOTES
Problem: Falls - Risk of 
Goal: *Absence of Falls Document Harper Barrientos Fall Risk and appropriate interventions in the flowsheet. Outcome: Progressing Towards Goal 
Fall Risk Interventions: 
  
 
Mentation Interventions: Door open when patient unattended, Toileting rounds Medication Interventions: Evaluate medications/consider consulting pharmacy Elimination Interventions: Bed/chair exit alarm, Call light in reach, Urinal in reach, Toileting schedule/hourly rounds History of Falls Interventions: Door open when patient unattended Problem: Patient Education: Go to Patient Education Activity Goal: Patient/Family Education Outcome: Progressing Towards Goal 
Patient on 2L oxygen via NC, verbalizes periodically, alert and responsive with no distress noted. Tolerated medications with no adverse reactions noted. Patient denies pain/discomfort. Bed in lowest position, callbell within reach and continue to monitor for safety.

## 2018-11-23 NOTE — PROGRESS NOTES
Discharge planning: 
 
[facility] Patient has been placed on \"will call\" for transportation. [address] [place] [address] is aware that he will be returning on Saturday. Nurse will need to call report to [phone] and ask for [address]. Patient's discharge packet is on his chart. Weekend care manager will need to phone transport \"will call\" once patient is cleared for discharge. Care manager will continue to closely monitor for discharge planning. Franca Schofield. [facility] Care Manager [phone]

## 2018-11-23 NOTE — PROGRESS NOTES
Important Message from Medicare unable to be reviewed and explained with the patient at bedside due to patient dementia. CMS unable to obtain signature, patient unable to sign.

## 2018-11-23 NOTE — PROGRESS NOTES
Per Nino (ARLETH) called [facility] transportation to schedule stretcher transport for will call on 11/24/18 and received confirmation #[phone] from Padmini Vazquez. Called [facility] set up transport for will call on 11/24/18 to [address] with Dorcas Baumgarten. Gave [facility] confirmation number [phone]. Informed Nino of transportation arrangements.

## 2018-11-23 NOTE — PROGRESS NOTES
Transfer to [address] [place], [place] [address]ly: 
 
Patient was transferred to [address] [place] [address]. This writer reviewed previous care manager notes and assessment. Patient was a resident at [address] [place] [address]. He will return to [address] [place] [address], once medically stable for discharge. This writer will continue to closely monitor for discharge planning to ensure a safe transfer back to [address] [place] [address]. Venus Johnson. [facility] Care Manager [phone]

## 2018-11-23 NOTE — PROGRESS NOTES
Patient transferred from CVT stepdown and on 2L oxygen, non-verbal but responsive with no distress noted. Bed in lowest position, callbell within reach and continue to monitor for safety.

## 2018-11-23 NOTE — PROGRESS NOTES
Bedside shift change report given to this rn (oncoming nurse) by richy (offgoing nurse). Report included the following information SBAR, Kardex and Cardiac Rhythm non tele. Pt Alert and oriented to self. Pt continues iv fluids and iv abx. Pt turned q2. Frequent rounding provided. Bed alarm on. Call light within reach.

## 2018-11-24 VITALS
HEART RATE: 80 BPM | WEIGHT: 160.5 LBS | DIASTOLIC BLOOD PRESSURE: 73 MMHG | SYSTOLIC BLOOD PRESSURE: 160 MMHG | HEIGHT: 63 IN | BODY MASS INDEX: 28.44 KG/M2 | RESPIRATION RATE: 19 BRPM | OXYGEN SATURATION: 98 % | TEMPERATURE: 98 F

## 2018-11-24 PROBLEM — A41.9 SEPTIC SHOCK (HCC): Status: RESOLVED | Noted: 2018-11-19 | Resolved: 2018-11-24

## 2018-11-24 PROBLEM — R65.21 SEPTIC SHOCK (HCC): Status: RESOLVED | Noted: 2018-11-19 | Resolved: 2018-11-24

## 2018-11-24 PROBLEM — A41.9 SEPSIS (HCC): Status: RESOLVED | Noted: 2018-11-19 | Resolved: 2018-11-24

## 2018-11-24 PROBLEM — K56.41 FECAL IMPACTION (HCC): Status: RESOLVED | Noted: 2018-11-21 | Resolved: 2018-11-24

## 2018-11-24 LAB
ANION GAP SERPL CALC-SCNC: 7 MMOL/L (ref 3–18)
BUN SERPL-MCNC: 7 MG/DL (ref 7–18)
BUN/CREAT SERPL: 7 (ref 12–20)
CALCIUM SERPL-MCNC: 8.2 MG/DL (ref 8.5–10.1)
CHLORIDE SERPL-SCNC: 106 MMOL/L (ref 100–108)
CO2 SERPL-SCNC: 29 MMOL/L (ref 21–32)
CREAT SERPL-MCNC: 1 MG/DL (ref 0.6–1.3)
GLUCOSE BLD STRIP.AUTO-MCNC: 106 MG/DL (ref 70–110)
GLUCOSE BLD STRIP.AUTO-MCNC: 134 MG/DL (ref 70–110)
GLUCOSE SERPL-MCNC: 114 MG/DL (ref 74–99)
MAGNESIUM SERPL-MCNC: 1.9 MG/DL (ref 1.6–2.6)
PHOSPHATE SERPL-MCNC: 2.2 MG/DL (ref 2.5–4.9)
POTASSIUM SERPL-SCNC: 3.5 MMOL/L (ref 3.5–5.5)
SODIUM SERPL-SCNC: 142 MMOL/L (ref 136–145)

## 2018-11-24 PROCEDURE — 74011250636 HC RX REV CODE- 250/636: Performed by: INTERNAL MEDICINE

## 2018-11-24 PROCEDURE — 83735 ASSAY OF MAGNESIUM: CPT

## 2018-11-24 PROCEDURE — 74011000258 HC RX REV CODE- 258: Performed by: EMERGENCY MEDICINE

## 2018-11-24 PROCEDURE — 80048 BASIC METABOLIC PNL TOTAL CA: CPT

## 2018-11-24 PROCEDURE — 77030010545

## 2018-11-24 PROCEDURE — 74011250637 HC RX REV CODE- 250/637: Performed by: INTERNAL MEDICINE

## 2018-11-24 PROCEDURE — 97165 OT EVAL LOW COMPLEX 30 MIN: CPT

## 2018-11-24 PROCEDURE — 84100 ASSAY OF PHOSPHORUS: CPT

## 2018-11-24 PROCEDURE — 74011250637 HC RX REV CODE- 250/637: Performed by: HOSPITALIST

## 2018-11-24 PROCEDURE — 82962 GLUCOSE BLOOD TEST: CPT

## 2018-11-24 PROCEDURE — 97162 PT EVAL MOD COMPLEX 30 MIN: CPT

## 2018-11-24 PROCEDURE — 36415 COLL VENOUS BLD VENIPUNCTURE: CPT

## 2018-11-24 PROCEDURE — 97530 THERAPEUTIC ACTIVITIES: CPT

## 2018-11-24 RX ORDER — SENNOSIDES 8.6 MG/1
1 TABLET ORAL DAILY
Qty: 30 TAB | Refills: 2 | Status: SHIPPED
Start: 2018-11-25

## 2018-11-24 RX ORDER — THERA TABS 400 MCG
1 TAB ORAL DAILY
Qty: 30 TAB | Refills: 11 | Status: SHIPPED
Start: 2018-11-25

## 2018-11-24 RX ORDER — LEVOFLOXACIN 750 MG/1
750 TABLET ORAL
Qty: 5 TAB | Refills: 0 | Status: SHIPPED
Start: 2018-11-25 | End: 2018-12-04

## 2018-11-24 RX ORDER — FUROSEMIDE 20 MG/1
20 TABLET ORAL
Qty: 25 TAB | Refills: 0 | Status: SHIPPED
Start: 2018-11-24

## 2018-11-24 RX ADMIN — THERA TABS 1 TABLET: TAB at 08:42

## 2018-11-24 RX ADMIN — DIBASIC SODIUM PHOSPHATE, MONOBASIC POTASSIUM PHOSPHATE AND MONOBASIC SODIUM PHOSPHATE 1 TABLET: 852; 155; 130 TABLET ORAL at 08:42

## 2018-11-24 RX ADMIN — Medication 400 MG: at 08:42

## 2018-11-24 RX ADMIN — SODIUM CHLORIDE 75 ML/HR: 450 INJECTION, SOLUTION INTRAVENOUS at 05:30

## 2018-11-24 RX ADMIN — LEVOTHYROXINE SODIUM 25 MCG: 25 TABLET ORAL at 05:30

## 2018-11-24 RX ADMIN — SENNOSIDES 8.6 MG: 8.6 TABLET, FILM COATED ORAL at 08:42

## 2018-11-24 RX ADMIN — HEPARIN SODIUM 5000 UNITS: 5000 INJECTION, SOLUTION INTRAVENOUS; SUBCUTANEOUS at 09:06

## 2018-11-24 RX ADMIN — BENZALKONIUM CHLORIDE, SODIUM PHOSPHATE, DIBASIC, EDETATE DISODIUM,SODIUM PHOSPHATE, MONOBASIC, SODIUM CHLORIDE, WATER, PHOSPHORIC ACID, SODIUM HYDROXIDE 1 DROP: 14 SOLUTION INTRAOCULAR at 05:30

## 2018-11-24 RX ADMIN — FINASTERIDE 5 MG: 5 TABLET, FILM COATED ORAL at 09:00

## 2018-11-24 RX ADMIN — HEPARIN SODIUM 5000 UNITS: 5000 INJECTION, SOLUTION INTRAVENOUS; SUBCUTANEOUS at 01:29

## 2018-11-24 NOTE — ROUTINE PROCESS
TRANSFER - OUT REPORT: 
 
Verbal report given to Gerardo(name) on Angelica Shea being transferred to [facility]) for routine progression of care Report consisted of patient's Situation, Background, Assessment and  
Recommendations(SBAR). Information from the following report(s) SBAR was reviewed with the receiving nurse. Opportunity for questions and clarification was provided. Patient transported with: 
 O2 @ 2 liters

## 2018-11-24 NOTE — DISCHARGE SUMMARY
Discharge Summary    Patient: Frankie Telles MRN: [id number]  CSN: [id number]    YOB: 1930  Age: 80 y.o. Sex: male    DOA: 11/19/2018 LOS:  LOS: 5 days   Discharge Date:      Admission Diagnoses: Septic shock ([facility]  Aspiration pneumonia ([facility]  Sepsis ([facility]    Discharge Diagnoses:    Problem List as of 11/24/2018 Date Reviewed: 5/22/2017          Codes Class Noted - Resolved    Advanced care planning/counseling discussion ICD-10-CM: Z71.89  ICD-9-CM: V65.49  Unknown - Present        Debility ICD-10-CM: R53.81  ICD-9-CM: 799.3  Unknown - Present        Malignant otitis externa of both ears ICD-10-CM: H60.23  ICD-9-CM: 380.14  6/20/2017 - Present        Malignant otitis externa ICD-10-CM: H60.20  ICD-9-CM: 380.14  6/11/2017 - Present        Urinary retention ICD-10-CM: R33.9  ICD-9-CM: 788.20  7/24/2015 - Present        BPH (benign prostatic hypertrophy) with urinary obstruction ICD-10-CM: N40.1, N13.8  ICD-9-CM: 600.01, 599.69  7/24/2015 - Present        RESOLVED: Constipation ICD-10-CM: K59.00  ICD-9-CM: 564.00  Unknown - 11/24/2018        RESOLVED: [facility] ICD-10-CM: K56.41  ICD-9-CM: 560.32  11/21/2018 - 11/24/2018        RESOLVED: Septic shock ([facility] ICD-10-CM: A41.9, R65.21  ICD-9-CM: 038.9, 785.52, 995.92  11/19/2018 - 11/24/2018        RESOLVED: Sepsis ([facility] ICD-10-CM: A41.9  ICD-9-CM: 038.9, 995.91  11/19/2018 - 11/24/2018            Reason for Admission   80 y.o.  male who is in long term care at [facility], presented to the ED with decreased responsiveness. Patient is a resident of a NH with the pmhx of dementia, DM2, deafness, bph, and HTN. Was noted to be less responsive over the past day and was brought to the ED, where noted to be septic: febrile, elevated wbc and evidence of uti. he also had evidence of JIMBO and had about 2L of urine in his bladder relieved by hudson placement. CT a/p showed severe fecal impaction.  There was not report of n/v, but it was noted that pt had bilateral infiltrates on CT scan and it was suggested to be aspiration. Patient reported no pneumonia symptoms, but also has dementia and is advanced in age. he had a lactic acid of > 6 initially. He responded well to fluids. Per pt's wife, pt is DNR/DNI, but otherwise wants everything done. Discharge Condition: Fair    PHYSICAL EXAM at discharge. Visit Vitals  /73 (BP 1 Location: Left arm, BP Patient Position: Sitting)   Pulse 80   Temp 98 °F (36.7 °C)   Resp 19   Ht 5' 3\" (1.6 m)   Wt 72.8 kg (160 lb 7.9 oz)   SpO2 98%   BMI 28.43 kg/m²     General:  Awake, in NAD  Cardiovascular:  S1S2+, RRR  Pulmonary: cta b.l  GI:  Soft, NT, ND nabs. Extremities:  trace edema  Deaf  Skin: no rash    Hospital Course:   1 Severe Sepsis (fever, tachycardia, leukocytosis) POA with evidence of end organ damage (JIMBO, elevated bili, lactic acidosis) - sources include PNA, UTI, bacteremia. 2. WATSON resolved - postrenal obstruction in the setting of fecal impaction. 3. JIMBO at admission resolved. 4. Fecal Impaction resolved. 5. DM2 - resume januvia, ssi. lantus stopped given sugars 100s in house. 6. BPH on proscar, flomax. 7. Hard of hearing - supportive care. 8. E coli Bacteremia - source m/l urine - repeat blood cx (11/21) ngtd. Changed to levaquin based on sensitivity. complete 14 days of abx from 1st negative blood cx.   9. Ecoli uti - changed to levaquin based on sensitivity. see #8. 10. Hypernatremia better. Needs access to free water at all times. 11. Anemia normocytic normochromic   12. Hypophosphatemia / hypokalemia - repleted. 13. Mild protein calorie malnutrition AEB Inadequate oral intake related to altered mentation and decreased appetite as evidenced by patient consuming 50% or less of recent meals. On supplements, nutritionist followed, multivit  14. Hypothyroidism on synthroid  15. mild oral and suspected mild pharyngeal dysphagia.  SLP recs:  Soft solid diet with thin liquids   Meds as tolerated   Aspiration precautions  HOB >45 degrees during all intake and for at least 30 min after po   Small bites/sips, Slow rate of intake   --> consider MBS as outpatient if recurrent aspiration noted. 16. DNR / durable DNR signed this admission (original sent to [facility] with patient). Return to [facility] today. sister [facility] agrees; all questions answered to the best of my ability.      Consults: Gastroenterology Dr. Danis blankenship NP Chantale Carter    Significant Diagnostic Studies: labs:   Recent Results (from the past 24 hour(s))   GLUCOSE, POC    Collection Time: 11/23/18  5:43 PM   Result Value Ref Range    Glucose (POC) 113 (H) 70 - 110 mg/dL   GLUCOSE, POC    Collection Time: 11/23/18  9:33 PM   Result Value Ref Range    Glucose (POC) 147 (H) 70 - 110 mg/dL   MAGNESIUM    Collection Time: 11/24/18  2:21 AM   Result Value Ref Range    Magnesium 1.9 1.6 - 2.6 mg/dL   METABOLIC PANEL, BASIC    Collection Time: 11/24/18  2:21 AM   Result Value Ref Range    Sodium 142 136 - 145 mmol/L    Potassium 3.5 3.5 - 5.5 mmol/L    Chloride 106 100 - 108 mmol/L    CO2 29 21 - 32 mmol/L    Anion gap 7 3.0 - 18 mmol/L    Glucose 114 (H) 74 - 99 mg/dL    BUN 7 7.0 - 18 MG/DL    Creatinine 1.00 0.6 - 1.3 MG/DL    BUN/Creatinine ratio 7 (L) 12 - 20      GFR est AA >60 >60 ml/min/1.73m2    GFR est non-AA >60 >60 ml/min/1.73m2    Calcium 8.2 (L) 8.5 - 10.1 MG/DL   PHOSPHORUS    Collection Time: 11/24/18  2:21 AM   Result Value Ref Range    Phosphorus 2.2 (L) 2.5 - 4.9 MG/DL   GLUCOSE, POC    Collection Time: 11/24/18  8:20 AM   Result Value Ref Range    Glucose (POC) 106 70 - 110 mg/dL   GLUCOSE, POC    Collection Time: 11/24/18 12:09 PM   Result Value Ref Range    Glucose (POC) 134 (H) 70 - 110 mg/dL     All Micro Results     Procedure Component Value Units Date/Time    CULTURE, BLOOD [[id number]] Collected:  11/21/18 1300    Order Status:  Completed Specimen:  Whole Blood Updated:  11/24/18 7593 Special Requests: NO SPECIAL REQUESTS        Culture result: NO GROWTH 3 DAYS       CULTURE, BLOOD [[id number]] Collected:  11/21/18 1310    Order Status:  Completed Specimen:  Whole Blood Updated:  11/24/18 0657     Special Requests: NO SPECIAL REQUESTS        Culture result: NO GROWTH 3 DAYS       CULTURE, BLOOD [[id number]]  (Abnormal) Collected:  11/19/18 1615    Order Status:  Completed Specimen:  Blood Updated:  11/23/18 1446     Special Requests: NO SPECIAL REQUESTS        GRAM STAIN       AEROBIC AND ANAEROBIC BOTTLES GRAM NEGATIVE RODS                  SMEAR CALLED TO AND CORRECTLY REPEATED BY: ROMMEL RAHMAN RN CVT ON 11/20 AT 0759 TO JCK           Culture result:       AEROBIC AND ANAEROBIC BOTTLES ESCHERICHIA COLI                  AEROBIC AND ANAEROBIC BOTTLES ESCHERICHIA COLI 2ND MORPHOTYPE                  For Susceptibility Refer to Culture  [id number]      CULTURE, BLOOD [[id number]]  (Abnormal)  (Susceptibility) Collected:  11/19/18 1630    Order Status:  Completed Specimen:  Blood Updated:  11/23/18 0819     Special Requests: NO SPECIAL REQUESTS        GRAM STAIN       AEROBIC AND ANAEROBIC BOTTLES GRAM NEGATIVE RODS                  SMEAR CALLED TO AND CORRECTLY REPEATED BY: ROMMEL RAHMAN RN CVT ON 11/20 AT 9296 TO JCK           Culture result:       AEROBIC AND ANAEROBIC BOTTLES ESCHERICHIA COLI                  AEROBIC AND ANAEROBIC BOTTLES ESCHERICHIA COLI 2ND MORPHOTYPE          INFLUENZA A & B AG (RAPID TEST) [[id number]] Collected:  11/20/18 1222    Order Status:  Completed Specimen:  Nasopharyngeal from Nasal washing Updated:  11/21/18 1412     Influenza A Antigen NEGATIVE         Comment: A negative result does not exclude influenza virus infection, seasonal or H1N1 due to suboptimal sensitivity. If influenza is circulating in your community, a diagnosis of influenza should be considered based on a patients clinical presentation and empiric antiviral treatment should be considered, if indicated. Influenza B Antigen NEGATIVE        CULTURE, RESPIRATORY/SPUTUM/BRONCH Terrence Olivia [[id number]] Collected:  11/21/18 1215    Order Status:  Canceled Specimen:  Sputum     CULTURE, URINE [[id number]]  (Abnormal)  (Susceptibility) Collected:  11/19/18 1645    Order Status:  Completed Specimen:  Urine from [place] Specimen Updated:  11/21/18 0851     Special Requests: NO SPECIAL REQUESTS        Culture result:       >100,000 COLONIES/mL ESCHERICHIA COLI          STREP PNEUMO AG, URINE [[id number]] Collected:  11/20/18 0230    Order Status:  Completed Specimen:  Urine, random Updated:  11/20/18 0328     Strep pneumo Ag, urine NEGATIVE        LEGIONELLA PNEUMOPHILA AG, URINE [[id number]] Collected:  11/20/18 0230    Order Status:  Completed Specimen:  Urine, random Updated:  11/20/18 0327     Legionella Ag, urine NEGATIVE            IMAGING  XR Results (most recent):  Results from [place] encounter on 11/19/18   XR CHEST PORT    Narrative EXAM: CHEST ONE VIEW portable 1715 hours    CLINICAL HISTORY/INDICATION: meets SIRS criteria , altered level of  consciousness onset morning of arrival, anorexia, lethargic, tachycardia,  febrile    COMPARISON: None. TECHNIQUE: Single view obtained. FINDINGS:     The cardiac silhouette is mildly enlarged. There is tortuosity of the aorta with  calcified plaque. The lungs are clear. Pulmonary vascularity is normal. The  costophrenic angles are sharply defined. No bony abnormalities are seen. Impression IMPRESSION:    Mild cardiomegaly. Atherosclerosis. CT Results (most recent):  Results from Hospital Encounter encounter on 11/19/18   CT ABD PELV WO CONT    Narrative CT ABDOMEN AND PELVIS WITHOUT CONTRAST    COMPARISON: None. INDICATIONS: Abdominal pain and leukocytosis.     TECHNIQUE: Volumetric data acquisition was performed of the abdomen and pelvis  on a multislice scanner and reconstructed in axial coronal and sagittal planes    CT ABDOMEN FINDINGS:     Lung Bases: There is bronchial thickening and patchy bronchocentric parenchymal  opacities with some confluence bilaterally. Liver/Gallbladder/Biliary: Normal.    Spleen: Normal.    Adrenal Glands: Normal.    Kidneys: There is mild fullness of the renal pelves and ureters bilaterally  right more than left. No calculus is evident perinephric stranding is present  more prominent on the right than the left. .    Pancreas: Normal.    Stomach, Small Bowel, and Colon: The stomach is unremarkable. There is no small  bowel distention. There is a large volume of stool throughout the colon. Lymph Nodes: Normal in size and number. The abdominal aorta is unremarkable. The IVC is unremarkable. Peritoneal Spaces: No free fluid or free air is present. Abdominal wall: No hernia or mass is evident    CT PELVIS FINDINGS:     Bladder: The bladder is collapsed about an indwelling Ahjua catheter. The  prostate is enlarged. Dimensions are 7.2 x 5.8 cm transaxially. . The bladder and  prostate are compressed against the posterior pubis by the fecal impaction    Pelvic Small Bowel And Colon: There is a large fecal impaction. This measures  7.6 x 7.7 cm    Lymph Nodes: . Free Fluid: Not present. Osseous Structures Of Abdomen And Pelvis: Unremarkable for age. Impression IMPRESSION:     Large fecal impaction. This compresses the prostate and bladder against the  pubis. There is some fullness of the upper renal collecting systems down to the pelvis. This may be secondary to the compression from the impaction. Perinephric  stranding suggesting edema is evident right more than left. There is bronchial thickening and bilateral patchy parenchymal opacities with  volume loss and some confluence. Acuity is uncertain. Finding is consistent with  bronchopneumonia or aspiration      .     All CT scans at this facility are performed using dose optimization technique as  appropriate to the performed exam, to include automated exposure control,  adjustment of the mA and/or kV according to patient's size (Including  appropriate matching for site-specific examinations), or use of iterative  reconstruction technique. EKG Results     Procedure 720 Value Units Date/Time    EKG, 12 LEAD, INITIAL [[id number]] Collected:  11/19/18 1646    Order Status:  Completed Updated:  11/21/18 0928     Ventricular Rate 105 BPM      Atrial Rate 105 BPM      P-R Interval 184 ms      QRS Duration 96 ms      Q-T Interval 328 ms      QTC Calculation (Bezet) 433 ms      Calculated P Axis 43 degrees      Calculated R Axis -63 degrees      Calculated T Axis 79 degrees      Diagnosis --     Sinus tachycardia  Left anterior fascicular block  Abnormal ECG  No previous ECGs available  Confirmed by Dmitri Tomlinson [phone]) on 11/21/2018 9:28:21 AM          Discharge Medications:     Current Discharge Medication List      START taking these medications    Details   senna (SENOKOT) 8.6 mg tablet Take 1 Tab by mouth daily. Give with a full glass of water. HOLD for loose stool. Qty: 30 Tab, Refills: 2      levoFLOXacin (LEVAQUIN) 750 mg tablet Take 1 Tab by mouth every fourty-eight (48) hours for 5 doses. First dose 11/25/18. Qty: 5 Tab, Refills: 0      phosphorus (K PHOS NEUTRAL) 250 mg tablet Take 1 Tab by mouth two (2) times a day for 3 days. Qty: 6 Tab, Refills: 0      therapeutic multivitamin (THERAGRAN) tablet Take 1 Tab by mouth daily. Qty: 30 Tab, Refills: 11         CONTINUE these medications which have CHANGED    Details   furosemide (LASIX) 20 mg tablet Take 1 Tab by mouth daily as needed. Qty: 25 Tab, Refills: 0         CONTINUE these medications which have NOT CHANGED    Details   erythromycin (ILOTYCIN) ophthalmic ointment Thin film both eyes Q8 hrs x 5 days  Qty: 1 Tube, Refills: 0      bisacodyl (DULCOLAX, BISACODYL,) 10 mg suppository Insert 10 mg into rectum daily as needed.       sodium phosphate (ENEMA) 19-7 gram/118 mL enema Insert 1 Enema into rectum as needed. glucagon (GLUCAGEN) 1 mg injection 1 mg by IntraVENous route as needed for Hypoglycemia. bisacodyl (DULCOLAX, BISACODYL,) 5 mg EC tablet Take 10 mg by mouth daily as needed for Constipation. Indications: Constipation, do not crush      SITagliptin (JANUVIA) 25 mg tablet Take 25 mg by mouth daily. Do not crush             atorvastatin (LIPITOR) 10 mg tablet Take 10 mg by mouth nightly. Indications: hypercholesterolemia      insulin aspart (NOVOLOG) 100 unit/mL injection 1-10 Units by SubCUTAneous route Before breakfast, lunch, dinner and at bedtime. Sliding scale  0-200=0units  201-250=2units  251-350=4units  351-400=6units  401-450=8units  451+=10units and call MD      levothyroxine (SYNTHROID) 25 mcg tablet Take 25 mcg by mouth Daily (before breakfast). cholecalciferol, vitamin D3, 2,000 unit tab Take 1 Tab by mouth daily. Indications: VITAMIN D DEFICIENCY      amLODIPine (NORVASC) 10 mg tablet Take 10 mg by mouth daily. Refills: 1      acetaminophen (TYLENOL) 325 mg tablet Take 325 mg by mouth every six (6) hours as needed for Pain or Fever. tamsulosin (FLOMAX) 0.4 mg capsule Take 0.4 mg by mouth nightly. finasteride (PROSCAR) 5 mg tablet Take 5 mg by mouth daily. lisinopril (PRINIVIL, ZESTRIL) 10 mg tablet Take 10 mg by mouth daily. STOP taking these medications       predniSONE (DELTASONE) 10 mg tablet Comments:   Reason for Stopping:        insulin glargine (LANTUS) 100 unit/mL injection 18 Units by SubCUTAneous route nightly. Activity: PT/OT Eval and Treat. FALL PRECAUTIONS. Diet: Cardiac Diet and Diabetic Diet.   Soft solid diet with thin liquids   Meds as tolerated   Aspiration precautions  HOB >45 degrees during all intake and for at least 30 min after po   Small bites/sips, Slow rate of intake     Wound Care: None needed    Follow-up:   SNF doctor upon arrival.     Minutes spent on discharge: greater than 30

## 2018-11-24 NOTE — PROGRESS NOTES
Discharge Planning: · Pt discharge to return to [facility],  
· [facility]) notified of discharge · Pt's sister notified of discharge and time of transportation set-up · [facility] transport set-up for transport of this pt · Nursing staff notified of this pt transfer and number to call for report. [phone] · Discharge Summary placed in cclink and sent with pt to the facility. [employer] [phone] Care Manager

## 2018-11-24 NOTE — DISCHARGE INSTRUCTIONS
Advance Directives: Care Instructions  Your Care Instructions  An advance directive is a legal way to state your wishes at the end of your life. It tells your family and your doctor what to do if you can no longer say what you want. There are two main types of advance directives. You can change them any time that your wishes change. · A living will tells your family and your doctor your wishes about life support and other treatment. · A durable power of  for health care lets you name a person to make treatment decisions for you when you can't speak for yourself. This person is called a health care agent. If you do not have an advance directive, decisions about your medical care may be made by a doctor or a  who doesn't know you. It may help to think of an advance directive as a gift to the people who care for you. If you have one, they won't have to make tough decisions by themselves. Follow-up care is a key part of your treatment and safety. Be sure to make and go to all appointments, and call your doctor if you are having problems. It's also a good idea to know your test results and keep a list of the medicines you take. How can you care for yourself at home? · Discuss your wishes with your loved ones and your doctor. This way, there are no surprises. · Many states have a unique form. Or you might use a universal form that has been approved by many states. This kind of form can sometimes be completed and stored online. Your electronic copy will then be available wherever you have a connection to the Internet. In most cases, doctors will respect your wishes even if you have a form from a different state. · You don't need a  to do an advance directive. But you may want to get legal advice. · Think about these questions when you prepare an advance directive:  ? Who do you want to make decisions about your medical care if you are not able to?  Many people choose a family member or close friend. ? Do you know enough about life support methods that might be used? If not, talk to your doctor so you understand. ? What are you most afraid of that might happen? You might be afraid of having pain, losing your independence, or being kept alive by machines. ? Where would you prefer to die? Choices include your home, a hospital, or a nursing home. ? Would you like to have information about hospice care to support you and your family? ? Do you want to donate organs when you die? ? Do you want certain [place] practices performed before you die? If so, put your wishes in the advance directive. · Read your advance directive every year, and make changes as needed. When should you call for help? Be sure to contact your doctor if you have any questions. Where can you learn more? Go to http://leandro-ary.info/. Enter R264 in the search box to learn more about \"Advance Directives: Care Instructions. \"  Current as of: April 19, 2018  Content Version: 11.8  © 3467-4367 [employer]. Care instructions adapted under license by [employer] (which disclaims liability or warranty for this information). If you have questions about a medical condition or this instruction, always ask your healthcare professional. [place] [address] any warranty or liability for your use of this information. Constipation: Care Instructions  Your Care Instructions    Constipation means that you have a hard time passing stools (bowel movements). People pass stools from 3 times a day to once every 3 days. What is normal for you may be different. Constipation may occur with pain in the rectum and cramping. The pain may get worse when you try to pass stools. Sometimes there are small amounts of bright red blood on toilet paper or the surface of stools. This is because of enlarged veins near the rectum (hemorrhoids).   A few changes in your diet and lifestyle may help you avoid ongoing constipation. Your doctor may also prescribe medicine to help loosen your stool. Some medicines can cause constipation. These include pain medicines and antidepressants. Tell your doctor about all the medicines you take. Your doctor may want to make a medicine change to ease your symptoms. Follow-up care is a key part of your treatment and safety. Be sure to make and go to all appointments, and call your doctor if you are having problems. It's also a good idea to know your test results and keep a list of the medicines you take. How can you care for yourself at home? · Drink plenty of fluids, enough so that your urine is light yellow or clear like water. If you have kidney, heart, or liver disease and have to limit fluids, talk with your doctor before you increase the amount of fluids you drink. · Include high-fiber foods in your diet each day. These include fruits, vegetables, beans, and whole grains. · Get at least 30 minutes of exercise on most days of the week. Walking is a good choice. You also may want to do other activities, such as running, swimming, cycling, or playing tennis or team sports. · Take a fiber supplement, such as Citrucel or Metamucil, every day. Read and follow all instructions on the label. · Schedule time each day for a bowel movement. A daily routine may help. Take your time having your bowel movement. · Support your feet with a small step stool when you sit on the toilet. This helps flex your hips and places your pelvis in a squatting position. · Your doctor may recommend an over-the-counter laxative to relieve your constipation. Examples are Milk of Magnesia and MiraLax. Read and follow all instructions on the label. Do not use laxatives on a long-term basis. When should you call for help?   Call your doctor now or seek immediate medical care if:    · You have new or worse belly pain.     · You have new or worse nausea or vomiting.     · You have blood in your stools.    Watch closely for changes in your health, and be sure to contact your doctor if:    · Your constipation is getting worse.     · You do not get better as expected. Where can you learn more? Go to http://leandro-ary.info/. Enter 21 [phone] in the search box to learn more about \"Constipation: Care Instructions. \"  Current as of: November 20, 2017  Content Version: 11.8  © 9807-2623 [employer]. Care instructions adapted under license by [employer] (which disclaims liability or warranty for this information). If you have questions about a medical condition or this instruction, always ask your healthcare professional. [place] [address] any warranty or liability for your use of this information. Sepsis: Care Instructions  Your Care Instructions    Sepsis is an intense reaction to an infection. It can cause deadly damage to the body and lead to a dangerously low blood pressure. You may have inflammation across large areas of your body. It can damage tissue and even go deep into your organs. Infections that can lead to sepsis include:  · A skin infection such as from a cut. · A lung infection like pneumonia. · A kidney infection. · A gut infection such as E. coli. It's important to care for yourself and try to avoid infections so that you don't get sepsis again. Follow-up care is a key part of your treatment and safety. Be sure to make and go to all appointments, and call your doctor if you are having problems. It's also a good idea to know your test results and keep a list of the medicines you take. How can you care for yourself at home? · If your doctor prescribed antibiotics, take them as directed. Do not stop taking them just because you feel better. You need to take the full course of antibiotics. · Help prevent infections that could lead to sepsis:  ? Try to avoid colds and flu.  If you must be around people who have a cold or the flu, wash your hands often. And get a flu vaccine every year. ? Get a pneumococcal vaccine shot (to prevent pneumonia, meningitis, and other infections). If you have had one before, ask your doctor if you need another dose. ? Clean any wounds or scrapes. · Do not smoke or use other tobacco products. When you quit smoking, you are less likely to get a cold, the flu, bronchitis, and pneumonia. If you need help quitting, talk to your doctor about stop-smoking programs and medicines. These can increase your chances of quitting for good. · To prevent dehydration, drink plenty of fluids. Choose water and other caffeine-free clear liquids until you feel better. If you have kidney, heart, or liver disease and have to limit fluids, talk with your doctor before you increase the amount of fluids you drink. · Eat a healthy diet. Include fruits, vegetables, and whole grains in your diet every day. · If your doctor recommends it, try doing some physical activity. Walking is a good choice. Bit by bit, increase the amount you walk every day. When should you call for help? [id number] anytime you think you may need emergency care. For example, call if:    · You passed out (lost consciousness).    Call your doctor now or seek immediate medical care if:    · You have symptoms of sepsis. These may include:  ? Shortness of breath. ? A fast heart rate. ? Cool, pale, or clammy skin. ? Feeling confused.     · You are dizzy or lightheaded, or you feel like you may faint.     · You have a fever or chills.    Watch closely for changes in your health, and be sure to contact your doctor if:    · You do not get better as expected. Where can you learn more? Go to http://leandro-ary.info/. Enter Q737 in the search box to learn more about \"Sepsis: Care Instructions. \"  Current as of: November 20, 2017  Content Version: 11.8  © 4279-7978 [employer].  Care instructions adapted under license by [employer] (which disclaims liability or warranty for this information). If you have questions about a medical condition or this instruction, always ask your healthcare professional. [address] any warranty or liability for your use of this information.

## 2018-11-24 NOTE — ROUTINE PROCESS
1931 Assumed care of patient from off going nurse, Lesa Falcon RN. Patient resting in bed. No distress noted. Call bell within reach, siderails up x 3, bed in lowest position, and patient instructed to use call bell for assistance. Will continue to monitor. 0725 Bedside and Verbal shift change report given to [facility] (oncoming nurse) by Julien Ray RN 
(offgoing nurse). Report included the following information Kardex, Intake/Output, MAR and Recent Results.

## 2018-11-24 NOTE — PROGRESS NOTES
Problem: Mobility Impaired (Adult and Pediatric) Goal: *Acute Goals and Plan of Care (Insert Text) Physical Therapy Goals Initiated 11/24/2018 and to be accomplished within 7 day(s) 1. Patient will scoot up and down in bed with moderate assistance . 2.  Patient will transfer from bed to chair and chair to bed with maximal assistance using the least restrictive device. 3.  Patient will perform sit to stand with moderate assistance . Outcome: Progressing Towards Goal 
physical Therapy EVALUATION Patient: Mellissa Grajeda (24 y.o. male) Date: 11/24/2018 Primary Diagnosis: Septic shock ([facility] [address] Aspiration pneumonia ([facility] [address] Sepsis ([facility] [address] Precautions: fall risk OBJECTIVE/ASSESSMENT : 
Based on the objective data described below, the patient presents with impaired functional mobility including bed mobility, transfers, ambulation, and general activity tolerance following admission for septic shock. Patient presented today laying in bed asleep. Patient transferred in bed with max assistance and perform supine to sit transfer with max assistance. He performed a sit to stand transfer with max assistance and required max assistance to stay in standing. At conclusion of session, patient left sitting up in recliner/resting comfortably in bed with call bell in reach, needs met, and nurse notified. Education: transfer training Patient will benefit from skilled intervention to address the above impairments. Patients rehabilitation potential is considered to be Fair Factors which may influence rehabilitation potential include:  
[]         None noted 
[x]         Mental ability/status []         Medical condition 
[]         Home/family situation and support systems 
[]         Safety awareness 
[]         Pain tolerance/management 
[x]         Other: deaf PLAN : 
Recommendations and Planned Interventions: 
[x]           Bed Mobility Training             [x]    Neuromuscular Re-Education [x]           Transfer Training                   []    Orthotic/Prosthetic Training 
[x]           Gait Training                          []    Modalities [x]           Therapeutic Exercises          []    Edema Management/Control 
[x]           Therapeutic Activities            [x]    Patient and Family Training/Education 
[]           Other (comment): Frequency/Duration: Patient will be followed by physical therapy 1-2 times per day, 4-7 days per week to address goals. Discharge Recommendations: Herminio Jacobs Further Equipment Recommendations for Discharge: N/A  
 
SUBJECTIVE:  
Patient stated foot has pain.  OBJECTIVE DATA SUMMARY:  
 
Past Medical History:  
Diagnosis Date  Deaf   
 bilateral  
 Deaf  Diabetes ([facility] [address]  Hyperglycemia  Hypertension  Hypertrophy of prostate with urinary obstruction and other lower urinary tract symptoms (LUTS)  Retention of urine  Retention, urine  Scarlet fever 1946 left pt deaf  UTI (lower urinary tract infection) Past Surgical History:  
Procedure Laterality Date [address] Barriers to Learning/Limitations: yes;  sensory deficits-vision/hearing/speech Compensate with: Visual Cues Prior Level of Function/Home Situation:  
Home Situation Home Environment: Skilled nursing facility Care Facility [facility]: [facility] One/Two Story Residence: One story Living Alone: No 
Support Systems: Skilled nursing facility Patient Expects to be Discharged to[de-identified] Skilled nursing facility Current DME Used/Available at Home: NoneCritical Behavior: 
Neurologic State: Drowsy; Lethargic Psychosocial 
Patient Behaviors: CooperativeStrength:   
 LE grossly 2+/5 Range Of Motion: 
  decreased B knee extension mobility Functional Mobility: 
Bed Mobility: 
Rolling: Maximum assistance Supine to Sit: Maximum assistance Sit to Supine: Maximum assistance Scooting: Maximum assistance Transfers: Sit to Stand: Maximum assistance Stand to Sit: Maximum assistance Balance:  
Sitting: With support Standing: With supportAmbulation/Gait Training: 
 Therapeutic Exercises:  
 
Pain: 
Pre session: 5/10 Post session: 5/10 Activity Tolerance:  
 
Please refer to the flowsheet for vital signs taken during this treatment. After treatment:  
[] Patient left in no apparent distress sitting up in chair 
[] Patient left sitting on EOB [x] Patient left in no apparent distress in bed 
[] Patient declined to be OOB at this time due to  
[x] Call bell left within reach 
[] Nursing notified 
[] Caregiver present 
[] Bed alarm activated 
[x] SCDs in place COMMUNICATION/EDUCATION:  
[]         Fall prevention education was provided and the patient/caregiver indicated understanding. []         Patient/family have participated as able in goal setting and plan of care. []         Patient/family agree to work toward stated goals and plan of care. []         Patient understands intent and goals of therapy, but is neutral about his/her participation. [x]         Patient is unable to participate in goal setting and plan of care. Thank you for this referral. 
Tasha Long, PT Time Calculation: 37 mins Mobility [id number] Current  CL= 60-79%   Goal  CL= 60-79%. The severity rating is based on the Level of Assistance required for Functional Mobility and ADLs.  
 
Eval Complexity: History: HIGH Complexity :3+ comorbidities / personal factors will impact the outcome/ POC Exam:MEDIUM Complexity : 3 Standardized tests and measures addressing body structure, function, activity limitation and / or participation in recreation  Presentation: MEDIUM Complexity : Evolving with changing characteristics  Clinical Decision Making: Overall Complexity:MEDIUM

## 2018-11-24 NOTE — PROGRESS NOTES
Problem: Self Care Deficits Care Plan (Adult) Goal: *Acute Goals and Plan of Care (Insert Text) Occupational Therapy Goals Initiated 11/24/2018 within 7 day(s). 1.  Patient will perform self-feeding with minimal assistance 2. Patient will perform grooming with minimal assistance 3. Patient will assist with maneuvering in bed in preparation for his participation in his self care routine Occupational Therapy EVALUATION Patient: Laurie Barefoot (10 y.o. male) Date: 11/24/2018 Primary Diagnosis: Septic shock ([facility] [address] Aspiration pneumonia ([facility] [address] Sepsis ([facility] [address] Precautions: fall ASSESSMENT : 
Based on the objective data described below, the patient presents with deafness, decreased ADL participation and decreased functional mobility with limits his independence at this time. Patient will benefit from skilled intervention to address the above impairments. Patients rehabilitation potential is considered to be Fair Factors which may influence rehabilitation potential include:  
[]             None noted []             Mental ability/status []             Medical condition []             Home/family situation and support systems []             Safety awareness []             Pain tolerance/management 
[]             Other: PLAN : 
Recommendations and Planned Interventions: 
[]               Self Care Training                  []        Therapeutic Activities []               Functional Mobility Training    []        Cognitive Retraining 
[]               Therapeutic Exercises           []        Endurance Activities []               Balance Training                   []        Neuromuscular Re-Education []               Visual/Perceptual Training     []   Home Safety Training 
[]               Patient Education                 []        Family Training/Education []               Other (comment): Frequency/Duration: Patient will be followed by occupational therapy 1-2 times per day/4-7 days per week to address goals. Discharge Recommendations: Herminio Jacobs Further Equipment Recommendations for Discharge: N/A Barriers to Learning/Limitations: yes;  altered mental status (i.e.Sedation, Confusion) and other deafness Compensate with: visual, verbal, tactile, kinesthetic cues/model PATIENT COMPLEXITY Eval Complexity: History: LOW Complexity : Brief history review ; Examination: LOW Complexity : 1-3 performance deficits relating to physical, cognitive , or psychosocial skils that result in activity limitations and / or participation restrictions ; Decision Making:LOW Complexity : No comorbidities that affect functional and no verbal or physical assistance needed to complete eval tasks  Assessment: LOW Complexity G-CODES:  
 
Self Care  Current  CN= 100%  Goal  CM= 80-99%. The severity rating is based on the Level of Assistance required for Functional Mobility and ADLs. SUBJECTIVE:  
Patient stated I want some tea.  he is choosing items from his breakfast tray OBJECTIVE DATA SUMMARY:  
 
Past Medical History:  
Diagnosis Date  Deaf   
 bilateral  
 Deaf  Diabetes ([facility] [address]  Hyperglycemia  Hypertension  Hypertrophy of prostate with urinary obstruction and other lower urinary tract symptoms (LUTS)  Retention of urine  Retention, urine  Scarlet fever 1946 left pt deaf  UTI (lower urinary tract infection) Past Surgical History:  
Procedure Laterality Date [address] Prior Level of Function/Home Situation: he is living in LTC setting and presents with learned helplessness as he is allowing total assistance with all of his self care tasks Home Situation Home Environment: Skilled nursing facility Care Facility Name: [facility] One/Two Story Residence: One story Living Alone: No 
Support Systems: Skilled nursing facility Patient Expects to be Discharged to[de-identified] Skilled nursing facility Current DME Used/Available at Home: None 
[x]  Right hand dominant   []  Left hand dominantCognitive/Behavioral Status: He is alert, following commands from written word or lip reading He requires encouragement to verbalize and interact and will wait to be fed as he opens his mouth to food presentation and can be facilitated to feed himself with hand over hand technique and backward chaining Skin: no skin integrity issues noted during OT evaluation Edema: no UE extremity edema noted Vision/Perceptual:   
 he holds his eyes midway opened and needs larger print when witting directions for him to follow; unable to follow multistep out of context commands for a complete visual assessment Coordination: 
 BUE's WFL within the constraints of minimal to moderate arthritic changes and apparent decreased visual acuity Balance: 
Sitting: CG with occasional min assist 
Standing: mod assist X 2 to stand 3/4 upright Strength: 
BUE's 4 to 4+/5 Tone & Sensation: 
BUE's [facility] Range of Motion: 
BUE's AROM WFL with the exception of shoulder flexion 0 - 90' Functional Mobility and Transfers for ADLs: 
Bed Mobility: 
Rolling: Maximum assistance Supine to Sit: Maximum assistance Sit to Supine: Maximum assistance Scooting: Maximum assistance Transfers: 
Sit to Stand: Maximum assistance ADL Assessment: 
 self feeding: dependent Grooming: dependent UB bathing/dressing: dependent LB bathing/dressing: dependent ADL Intervention: 
 facilitation of self feeding as noted above Pain: 
Pt reports 0/10 pain or discomfort prior to treatment.   
Pt reports 0/10 pain or discomfort post treatment. Activity Tolerance:  
No SOB noted; he does present with generalized fatigue Please refer to the flowsheet for vital signs taken during this treatment. After treatment:  
[] Patient left in no apparent distress sitting up in chair [x] Patient left in no apparent distress in bed 
[x] Call bell left within reach 
[] Nursing notified 
[] Caregiver present 
[] Bed alarm activated COMMUNICATION/EDUCATION:  
[] Home safety education was provided and the patient/caregiver indicated understanding. [] Patient/family have participated as able in goal setting and plan of care. [] Patient/family agree to work toward stated goals and plan of care. [] Patient understands intent and goals of therapy, but is neutral about his/her participation. [x] Patient is unable to participate in goal setting and plan of care. Thank you for this referral. 
Lily Franco, OTR/L Time Calculation: 16 mins

## 2018-11-27 LAB
BACTERIA SPEC CULT: NORMAL
BACTERIA SPEC CULT: NORMAL
SERVICE CMNT-IMP: NORMAL
SERVICE CMNT-IMP: NORMAL

## 2018-11-28 ENCOUNTER — PATIENT OUTREACH (OUTPATIENT)
Dept: CASE MANAGEMENT | Age: 83
End: 2018-11-28

## 2018-11-28 NOTE — PROGRESS NOTES
Community Care Team Documentation for Patient in [facility]     Patient discharged from [facility] 11/19/2018 - [facility] Hospital Discharge diagnosis:  Septic shock, Aspiration pneumonia     SNF Attending Provider: Dayday Moreno    Anticipated discharge date from SNF:  n/a      PCP : Alen, MD Shan    Nurse Navigator:     [facility] Team rounds completed, updates provided by facility. DNR, not skilled. LTC resident. Stable  Will follow for 30 day MINOR. Low Risk            5       Total Score        3 Has Seen PCP in Last 6 Months (Yes=3, No=0)    2 . Living with Significant Other. Assisted Living. LTAC. SNF. or   Rehab        Criteria that do not apply:    Patient Length of Stay (>5 days = 3)    IP Visits Last 12 Months (1-3=4, 4=9, >4=11)    Pt.  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    Advanced care planning/counseling discussion     Debility      Resolved Ambulatory Problems     Diagnosis Date Noted    Septic shock ([address] 11/19/2018    Sepsis ([address] 11/19/2018    Constipation     Fecal impaction ([address] 11/21/2018     Past Medical History:   Diagnosis Date    Deaf     Deaf     Diabetes ([address]     Hyperglycemia     Hypertension     Hypertrophy of prostate with urinary obstruction and other lower urinary tract symptoms (LUTS)     Retention of urine     Retention, urine     Scarlet fever 1946    UTI (lower urinary tract infection)

## 2018-12-05 ENCOUNTER — PATIENT OUTREACH (OUTPATIENT)
Dept: CASE MANAGEMENT | Age: 83
End: 2018-12-05

## 2018-12-05 NOTE — PROGRESS NOTES
Community Care Team Documentation for Patient in [facility]     Patient discharged from [facility]19[facility] Hospital Discharge diagnosis:  Septic shock, Aspiration pneumonia     SNF Attending Provider: Amanda Sweet    Anticipated discharge date from SNF:  n/a      PCP : lAen, MD Shan    Nurse Navigator:     [facility] Team rounds completed, updates provided by facility. DNR, not skilled. LTC resident. Stable  Will follow for 30 day MINOR. Low Risk            5       Total Score        3 Has Seen PCP in Last 6 Months (Yes=3, No=0)    2 . Living with Significant Other. Assisted Living. LTAC. SNF. or   Rehab        Criteria that do not apply:    Patient Length of Stay (>5 days = 3)    IP Visits Last 12 Months (1-3=4, 4=9, >4=11)    Pt.  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    Advanced care planning/counseling discussion     Debility      Resolved Ambulatory Problems     Diagnosis Date Noted    Septic shock ([address] 11/19/2018    Sepsis ([address] 11/19/2018    Constipation     Fecal impaction ([address] 11/21/2018     Past Medical History:   Diagnosis Date    Deaf     Deaf     Diabetes ([address]     Hyperglycemia     Hypertension     Hypertrophy of prostate with urinary obstruction and other lower urinary tract symptoms (LUTS)     Retention of urine     Retention, urine     Scarlet fever 1946    UTI (lower urinary tract infection)

## 2018-12-07 NOTE — ED NOTES
Please see the note by Resident Fabiano [email or web address]. I supervised her care during her ED rotation. Kayli Cho, DO 6:17 PM 
 
I personally saw and examined the patient. I have reviewed and agree with the residents findings, including all diagnostic interpretations, and plans as written. I was present during the key portions of separately billed procedures.  
Dell Verduzco MD

## 2020-09-04 ENCOUNTER — HOSPITAL ENCOUNTER (OUTPATIENT)
Dept: LAB | Age: 85
Discharge: HOME | End: 2020-09-04

## 2020-09-04 LAB — TROPONIN I SERPL-MCNC: 0.12 NG/ML (ref 0–0.04)

## 2020-09-04 PROCEDURE — 84484 ASSAY OF TROPONIN QUANT: CPT
